# Patient Record
Sex: MALE | Race: WHITE | Employment: FULL TIME | URBAN - METROPOLITAN AREA
[De-identification: names, ages, dates, MRNs, and addresses within clinical notes are randomized per-mention and may not be internally consistent; named-entity substitution may affect disease eponyms.]

---

## 2021-05-20 ENCOUNTER — OFFICE VISIT (OUTPATIENT)
Dept: INTERNAL MEDICINE CLINIC | Age: 36
End: 2021-05-20
Payer: COMMERCIAL

## 2021-05-20 VITALS
OXYGEN SATURATION: 98 % | TEMPERATURE: 98.3 F | HEIGHT: 70 IN | HEART RATE: 99 BPM | RESPIRATION RATE: 18 BRPM | WEIGHT: 201 LBS | SYSTOLIC BLOOD PRESSURE: 132 MMHG | DIASTOLIC BLOOD PRESSURE: 90 MMHG | BODY MASS INDEX: 28.77 KG/M2

## 2021-05-20 DIAGNOSIS — H51.9 EYE MOVEMENT ABNORMALITY: ICD-10-CM

## 2021-05-20 DIAGNOSIS — Z00.00 ROUTINE GENERAL MEDICAL EXAMINATION AT A HEALTH CARE FACILITY: Primary | ICD-10-CM

## 2021-05-20 DIAGNOSIS — J30.89 ENVIRONMENTAL AND SEASONAL ALLERGIES: ICD-10-CM

## 2021-05-20 DIAGNOSIS — R41.3 MEMORY DIFFICULTIES: ICD-10-CM

## 2021-05-20 DIAGNOSIS — R42 DIZZINESS: ICD-10-CM

## 2021-05-20 DIAGNOSIS — R03.0 ELEVATED BLOOD PRESSURE READING WITHOUT DIAGNOSIS OF HYPERTENSION: ICD-10-CM

## 2021-05-20 DIAGNOSIS — Z76.89 ENCOUNTER TO ESTABLISH CARE: ICD-10-CM

## 2021-05-20 PROCEDURE — 99205 OFFICE O/P NEW HI 60 MIN: CPT | Performed by: NURSE PRACTITIONER

## 2021-05-20 RX ORDER — SODIUM CHLORIDE 0.65 %
1 AEROSOL, SPRAY (ML) NASAL AS NEEDED
Qty: 30 ML | Refills: 0
Start: 2021-05-20

## 2021-05-20 RX ORDER — FLUTICASONE PROPIONATE 50 MCG
2 SPRAY, SUSPENSION (ML) NASAL
Qty: 1 BOTTLE | Refills: 0
Start: 2021-05-20 | End: 2022-06-17

## 2021-05-20 NOTE — PROGRESS NOTES
[facility] [place] [address] [address] [facility], [address] [facility] [address] [place]  [phone] [id number]/[phone] fax    5/20/2021    HPI:   Bebo Muniz 1985 is a pleasant WHITE male who presents today to establish care and for routine physical exam. He is a  at [employer]. He has been experiencing dizziness and the feeling of tingling all over. He has also noticed some memory issues. His wife tells him his eyes will gaze off where 1 eye is looking straight ahead and the other eye may look off to the side. He does not know this is occuring until his wife mentions it. He wears glasses and his last eye appt was 1 yr ago. His mother has MS. He does suffer from seasonal allergies. No past medical history on file. No past surgical history on file. Current Outpatient Medications   Medication Sig    loratadine (CLARITIN PO) Take  by mouth.  fluticasone propionate (Flonase Allergy Relief) 50 mcg/actuation nasal spray 2 Sprays by Both Nostrils route daily as needed for Rhinitis or Allergies.  sodium chloride (Saline Nasal Mist) 0.65 % nasal squeeze bottle 0.05 mL by Both Nostrils route as needed for Congestion. No current facility-administered medications for this visit. Allergies and Intolerances:   Not on File  Family History:   No family history on file. Social History:   He  reports that he has never smoked. He has never used smokeless tobacco.   Social History     Substance and Sexual Activity   Alcohol Use None     Immunization History: There is no immunization history on file for this patient. Review of Systems:   As above included in HPI. Otherwise 11 point review of systems negative including constitutional, skin, HENT, eyes, respiratory, cardiovascular, gastrointestinal, genitourinary, musculoskeletal, endocrine, hematologic, allergy, and neurologic.       Physical:   Visit Vitals  BP (!) 132/90   Pulse 99   Temp 98.3 °F (36.8 °C) (Temporal)   Resp 18   Ht 5' 10\" (1.778 m)   Wt 201 lb (91.2 kg)   SpO2 98%   BMI 28.84 kg/m²      Wt Readings from Last 3 Encounters:   05/20/21 201 lb (91.2 kg)         Exam:   Physical Exam  Vitals and nursing note reviewed. Constitutional:       Appearance: Normal appearance. HENT:      Head: Normocephalic and atraumatic. Right Ear: Tympanic membrane, ear canal and external ear normal.      Left Ear: Tympanic membrane, ear canal and external ear normal.      Ears:      Comments: ventura ear canals irritated     Nose: Nose normal.      Mouth/Throat:      Mouth: Mucous membranes are moist.   Eyes:      General: Lids are normal. No visual field deficit. Extraocular Movements: Extraocular movements intact. Right eye: Normal extraocular motion and no nystagmus. Left eye: Normal extraocular motion and no nystagmus. Conjunctiva/sclera: Conjunctivae normal.      Pupils: Pupils are equal, round, and reactive to light. Comments: Prescription glasses    Neck:      Vascular: No carotid bruit. Cardiovascular:      Rate and Rhythm: Normal rate and regular rhythm. Pulses: Normal pulses. Heart sounds: Normal heart sounds. Comments: No edema noted  Pulmonary:      Effort: Pulmonary effort is normal. No respiratory distress. Breath sounds: Normal breath sounds. No wheezing. Abdominal:      General: Abdomen is flat. Bowel sounds are normal. There is no distension. Palpations: Abdomen is soft. Tenderness: There is no abdominal tenderness. Musculoskeletal:         General: Normal range of motion. Cervical back: Normal range of motion and neck supple. Comments: Gait stable   Skin:     General: Skin is warm and dry. Neurological:      General: No focal deficit present. Mental Status: He is alert and oriented to person, place, and time. Cranial Nerves: Cranial nerves are intact. No cranial nerve deficit. Sensory: Sensation is intact.  No sensory deficit. Motor: Motor function is intact. No weakness. Coordination: Coordination is intact. Coordination normal.      Gait: Gait is intact. Gait normal.      Comments: Neuro exam intact   Psychiatric:         Attention and Perception: Attention and perception normal.         Mood and Affect: Mood normal.         Speech: Speech normal.         Behavior: Behavior normal.         Thought Content: Thought content normal.         Cognition and Memory: Cognition and memory normal.         Judgment: Judgment normal.         Review of Data:  Labs reviewed: N/A  Unable to obtain labs at this time. Pt to call the office when he is ready to have labs drawn as his medical insurance is inactive. Plan:    ICD-10-CM ICD-9-CM    1. Routine general medical examination at a health care facility  Z00.00 V70.0    2. Encounter to establish care  Z76.89 V65.8    3. Environmental and seasonal allergies  J30.89 477.8 fluticasone propionate (Flonase Allergy Relief) 50 mcg/actuation nasal spray      sodium chloride (Saline Nasal Mist) 0.65 % nasal squeeze bottle   4. Dizziness  R42 780.4    5. Memory difficulties  R41.3 780.93 REFERRAL TO NEUROPSYCHOLOGY   6. Elevated blood pressure reading without diagnosis of hypertension  R03.0 796.2    7. Eye movement abnormality  H51.9 378.9      -Encounter to establish care and routine physical. There is no info in EMR. Completed CPE. Unable to obtain labs due to issues with patients medical insurance. He will call the office when he corrects his medical insurance and is ready to have labs drawn.    -Seasonal allergies  Take OTC allergy medication; avoid decongestant  Use saline spray before bed  Flonase in the am    -Memory difficulty  Unable to describe what he is experiencing. Not sure if this is memory or focus issues.    Will refer to neuropsych for consult.    -Elevated BP reading  No hx of HTN  Pt to check BP every day x2w and call results to office.     -Eye movement abnormality  This was not noted today.    Instructed pt to see optometrist for eye exam with dilatation      Follow up 1 year  Labs needed 1 week prior to appt: Yes    Dr. Kary Yusuf, AGNP-C, DNP  [facility] [address] [place] [address]

## 2021-05-20 NOTE — Clinical Note
5/20/2021 5:10 PM 
 
Mr. Tracey Perales 
[address] [address] Sincerely, Blayne Burks, DNP

## 2021-05-20 NOTE — Clinical Note
Beatrice Lopez call pt with the following:   Please call patient and remind him to get an eye exam with dilatation. I placed a referral for neuropsych for consult due to memory issues. Remind pt to check BP every day and call office in 2 weeks with results. Also, inquire if patient was able to correct his medical insurance so he can get labs completed.  Thank you

## 2021-05-24 PROBLEM — J30.89 ENVIRONMENTAL AND SEASONAL ALLERGIES: Status: ACTIVE | Noted: 2021-05-24

## 2021-05-24 PROBLEM — R03.0 ELEVATED BLOOD PRESSURE READING WITHOUT DIAGNOSIS OF HYPERTENSION: Status: ACTIVE | Noted: 2021-05-24

## 2021-05-24 PROBLEM — R41.3 MEMORY DIFFICULTIES: Status: ACTIVE | Noted: 2021-05-24

## 2021-05-24 PROBLEM — R42 DIZZINESS: Status: ACTIVE | Noted: 2021-05-24

## 2021-05-24 PROBLEM — H51.9 EYE MOVEMENT ABNORMALITY: Status: ACTIVE | Noted: 2021-05-24

## 2021-05-25 NOTE — PROGRESS NOTES
Patient called and reminded of the following per Dr. Reyes Mariee:    Please call patient and remind him to get an eye exam with dilatation. I placed a referral for neuropsych for consult due to memory issues. Remind pt to check BP every day and call office in 2 weeks with results. Also, inquire if patient was able to correct his medical insurance so he can get labs completed. -  Pt states he should have everything corrected with his insurance by the end of this week and he will call us to schedule lab appt hopefully on Tuesday.

## 2021-06-07 DIAGNOSIS — Z00.00 ROUTINE GENERAL MEDICAL EXAMINATION AT A HEALTH CARE FACILITY: Primary | ICD-10-CM

## 2021-06-07 NOTE — PROGRESS NOTES
Was unable to obtain labs at new patient appt/physical due to insurance issues. Pt is now coming in tomorrow to have these labs drawn since he has corrected his insurance. ICD-10-CM ICD-9-CM    1.  Routine general medical examination at a health care facility  F45.36 S80.0 METABOLIC PANEL, COMPREHENSIVE      LIPID PANEL

## 2021-06-08 ENCOUNTER — APPOINTMENT (OUTPATIENT)
Dept: INTERNAL MEDICINE CLINIC | Age: 36
End: 2021-06-08

## 2021-06-09 LAB
ALBUMIN SERPL-MCNC: 4.7 G/DL (ref 4–5)
ALBUMIN/GLOB SERPL: 1.9 {RATIO} (ref 1.2–2.2)
ALP SERPL-CCNC: 58 IU/L (ref 48–121)
ALT SERPL-CCNC: 26 IU/L (ref 0–44)
AST SERPL-CCNC: 20 IU/L (ref 0–40)
BILIRUB SERPL-MCNC: 0.5 MG/DL (ref 0–1.2)
BUN SERPL-MCNC: 16 MG/DL (ref 6–20)
BUN/CREAT SERPL: 16 (ref 9–20)
CALCIUM SERPL-MCNC: 9.3 MG/DL (ref 8.7–10.2)
CHLORIDE SERPL-SCNC: 104 MMOL/L (ref 96–106)
CHOLEST SERPL-MCNC: 241 MG/DL (ref 100–199)
CO2 SERPL-SCNC: 22 MMOL/L (ref 20–29)
CREAT SERPL-MCNC: 0.99 MG/DL (ref 0.76–1.27)
GLOBULIN SER CALC-MCNC: 2.5 G/DL (ref 1.5–4.5)
GLUCOSE SERPL-MCNC: 76 MG/DL (ref 65–99)
HDLC SERPL-MCNC: 41 MG/DL
IMP & REVIEW OF LAB RESULTS: NORMAL
LDLC SERPL CALC-MCNC: 161 MG/DL (ref 0–99)
POTASSIUM SERPL-SCNC: 4.3 MMOL/L (ref 3.5–5.2)
PROT SERPL-MCNC: 7.2 G/DL (ref 6–8.5)
SODIUM SERPL-SCNC: 141 MMOL/L (ref 134–144)
TRIGL SERPL-MCNC: 211 MG/DL (ref 0–149)
VLDLC SERPL CALC-MCNC: 39 MG/DL (ref 5–40)

## 2021-06-10 DIAGNOSIS — E78.2 MIXED HYPERLIPIDEMIA: Primary | ICD-10-CM

## 2021-06-10 DIAGNOSIS — I10 ESSENTIAL HYPERTENSION: ICD-10-CM

## 2021-06-10 RX ORDER — ATORVASTATIN CALCIUM 20 MG/1
20 TABLET, FILM COATED ORAL DAILY
Qty: 90 TABLET | Refills: 0 | Status: SHIPPED | OUTPATIENT
Start: 2021-06-10 | End: 2021-09-06

## 2021-06-10 RX ORDER — CHLORTHALIDONE 25 MG/1
25 TABLET ORAL DAILY
Qty: 90 TABLET | Refills: 0 | Status: SHIPPED | OUTPATIENT
Start: 2021-06-10 | End: 2021-09-06

## 2021-06-10 NOTE — PROGRESS NOTES
hao Akhtar contact pt with the followin) [facility] Diastolic readings remain elevated. Will initiate chlorthalidone 25mg every day. 2)  Goal <150;  Goal <100. Due to elevated levels and new dx of HTN, will initiate atorvastatin 20mg every day. 3) CMP good    Inquire if he can come in to see me on 21 at 11am. If so please schedule appt. We will discuss further follow up at this appt. No labs need prior to this appt.    Thank you

## 2021-06-10 NOTE — PROGRESS NOTES
ICD-10-CM ICD-9-CM    1. Mixed hyperlipidemia  E78.2 272.2 atorvastatin (LIPITOR) 20 mg tablet   2. Essential hypertension  I10 401.9 chlorthalidone (HYGROTON) 25 mg tablet     hao Akhtar contact pt with the followin) BP readings reviewed from MyChart from patient. Diastolic readings remain elevated. Will initiate chlorthalidone 25mg every day. 2)  Goal <150;  Goal <100. Due to elevated levels and new dx of HTN, will initiate atorvastatin 20mg every day. 3) CMP good    Inquire if he can come in to see me on 21 at 11am. If so please schedule appt. We will discuss further follow up at this appt. No labs need prior to this appt.    Thank you

## 2021-06-10 NOTE — PROGRESS NOTES
Patient reached and made aware of message per Dr. Trinity Wang:    1) BP readings reviewed from MyChart from patient. Diastolic readings remain elevated. Will initiate chlorthalidone 25mg every day. 2)  Goal <150;  Goal <100. Due to elevated levels and new dx of HTN, will initiate atorvastatin 20mg every day.    3) CMP good     Inquire if he can come in to see me on 21 at 11am. If so please schedule appt. We will discuss further follow up at this appt. No labs need prior to this appt.      -  Pt verbalized understanding and was scheduled for appointment on  at 11am.

## 2021-07-08 ENCOUNTER — OFFICE VISIT (OUTPATIENT)
Dept: INTERNAL MEDICINE CLINIC | Age: 36
End: 2021-07-08
Payer: COMMERCIAL

## 2021-07-08 VITALS
HEIGHT: 70 IN | HEART RATE: 105 BPM | DIASTOLIC BLOOD PRESSURE: 84 MMHG | TEMPERATURE: 98.2 F | BODY MASS INDEX: 28.23 KG/M2 | SYSTOLIC BLOOD PRESSURE: 133 MMHG | WEIGHT: 197.2 LBS | RESPIRATION RATE: 18 BRPM | OXYGEN SATURATION: 96 %

## 2021-07-08 DIAGNOSIS — H50.10 EXOTROPIA: ICD-10-CM

## 2021-07-08 DIAGNOSIS — E78.2 MIXED HYPERLIPIDEMIA: ICD-10-CM

## 2021-07-08 DIAGNOSIS — H40.003 GLAUCOMA SUSPECT OF BOTH EYES: ICD-10-CM

## 2021-07-08 DIAGNOSIS — H52.223 REGULAR ASTIGMATISM OF BOTH EYES: ICD-10-CM

## 2021-07-08 DIAGNOSIS — I10 ESSENTIAL HYPERTENSION: Primary | ICD-10-CM

## 2021-07-08 DIAGNOSIS — H52.13 MYOPIA OF BOTH EYES: ICD-10-CM

## 2021-07-08 PROBLEM — R03.0 ELEVATED BLOOD PRESSURE READING WITHOUT DIAGNOSIS OF HYPERTENSION: Status: RESOLVED | Noted: 2021-05-24 | Resolved: 2021-07-08

## 2021-07-08 PROCEDURE — 99214 OFFICE O/P EST MOD 30 MIN: CPT | Performed by: NURSE PRACTITIONER

## 2021-07-08 NOTE — PROGRESS NOTES
Chief Complaint   Patient presents with   [address]     completed 6/8/21       1. Have you been to the ER, urgent care clinic since your last visit? Hospitalized since your last visit? No    2. Have you seen or consulted any other health care providers outside of the [address] [place] [address] since your last visit? Include any pap smears or colon screening.  No.

## 2021-07-08 NOTE — PROGRESS NOTES
Subjective:     Jimi Monique is a 43 y.o. male who presents for follow up of hypertension and hyperlipidemia, eye concerns. Diet and Lifestyle: generally follows a low fat low cholesterol diet, exercises sporadically  Home BP Monitoring: Measures BP periodically. Cardiovascular ROS: taking medications as instructed, no medication side effects noted, no TIA's, no chest pain on exertion, no dyspnea on exertion, no swelling of ankles, no palpitations. New concerns: after starting hygroton, noted BM freq decline to every 3-4 days instead of daily. Have not been consuming much water but have been outside in the heat. He saw optometrist for eye exam and dilatation. She mentioned \"increased pressure in his eyes. \"  She referred him to an ophthalmologist at [facility] who performed an OCT test.  Patient is waiting for these results. He is unsure of what they are looking for although he does have glaucoma in his family. Patient Active Problem List   Diagnosis Code    Eye movement abnormality H51.9    Memory difficulties R41.3    Dizziness R42    Environmental and seasonal allergies J30.89    Essential hypertension I10    Mixed hyperlipidemia E78.2    Glaucoma suspect of both eyes H40.003    Regular astigmatism of both eyes H52.223    Myopia of both eyes H52.13     Current Outpatient Medications   Medication Sig Dispense Refill    chlorthalidone (HYGROTON) 25 mg tablet Take 1 Tablet by mouth daily. Indications: high blood pressure 90 Tablet 0    atorvastatin (LIPITOR) 20 mg tablet Take 1 Tablet by mouth daily. 90 Tablet 0    loratadine (CLARITIN PO) Take  by mouth.  fluticasone propionate (Flonase Allergy Relief) 50 mcg/actuation nasal spray 2 Sprays by Both Nostrils route daily as needed for Rhinitis or Allergies. 1 Bottle 0    sodium chloride (Saline Nasal Mist) 0.65 % nasal squeeze bottle 0.05 mL by Both Nostrils route as needed for Congestion.  30 mL 0     Not on File Review of Systems, additional:  Pertinent items are noted in HPI. Objective:     Visit Vitals  /84   Pulse (!) 105   Temp 98.2 °F (36.8 °C) (Temporal)   Resp 18   Ht 5' 10\" (1.778 m)   Wt 197 lb 3.2 oz (89.4 kg)   SpO2 96%   BMI 28.30 kg/m²     Appearance: alert, well appearing, and in no distress, oriented to person, place, and time, overweight and improved. General exam: CVS exam BP noted to be well controlled today in office, S1, S2 normal, no gallop, no murmur, chest clear, no JVD, no HSM, no edema. Lab review: no lab studies available for review at time of visit. Assessment/Plan:       ICD-10-CM ICD-9-CM    1. Essential hypertension  R05 658.2 METABOLIC PANEL, COMPREHENSIVE   2. Mixed hyperlipidemia  E78.2 272.2 LIPID PANEL   3. Exotropia  H50.10 378.10    4. Myopia of both eyes  H52.13 367.1    5. Regular astigmatism of both eyes  H52.223 367.21    6. Glaucoma suspect of both eyes  H40.003 365.00      hypertension well controlled, stable, asymptomatic, hyperlipidemia asymptomatic, no significant medication side effects noted, will assess CMP and Lipid 3 months. current treatment plan is effective, no change in therapy  repeat labs ordered prior to next appointment  orders and follow up as documented in patient record  reviewed diet, exercise and weight control  recommended sodium restriction  cardiovascular risk and specific lipid/LDL goals reviewed  reviewed medications and side effects in detail. Reviewed eye exam from Dr. Lisy Hinton. Diagnosis of exotropia. Referred patient to [facility] for an OCT to rule out MS and glaucomatous changes versus a decompensating phoria.     Dr Verner Hoh, JASKARANP-C, DNP  [facility]

## 2021-09-05 DIAGNOSIS — I10 ESSENTIAL HYPERTENSION: ICD-10-CM

## 2021-09-05 DIAGNOSIS — E78.2 MIXED HYPERLIPIDEMIA: ICD-10-CM

## 2021-09-06 RX ORDER — CHLORTHALIDONE 25 MG/1
TABLET ORAL
Qty: 30 TABLET | Refills: 0 | Status: SHIPPED | OUTPATIENT
Start: 2021-09-06 | End: 2021-09-28 | Stop reason: SDUPTHER

## 2021-09-06 RX ORDER — ATORVASTATIN CALCIUM 20 MG/1
TABLET, FILM COATED ORAL
Qty: 30 TABLET | Refills: 0 | Status: SHIPPED | OUTPATIENT
Start: 2021-09-06 | End: 2021-09-28 | Stop reason: SDUPTHER

## 2021-09-07 NOTE — TELEPHONE ENCOUNTER
Refilled x1 month.  F/U 8/28/21    Requested Prescriptions     Signed Prescriptions Disp Refills    chlorthalidone (HYGROTON) 25 mg tablet 30 Tablet 0     Sig: TAKE 1 TABLET BY MOUTH EVERY DAY FOR HIGH BLOOD PRESSURE     Authorizing Provider: SHAMEKA VILLAGRAN    atorvastatin (LIPITOR) 20 mg tablet 30 Tablet 0     Sig: TAKE 1 TABLET BY MOUTH EVERY DAY     Authorizing Provider: Manas Horvath

## 2021-09-21 ENCOUNTER — APPOINTMENT (OUTPATIENT)
Dept: INTERNAL MEDICINE CLINIC | Age: 36
End: 2021-09-21

## 2021-09-21 DIAGNOSIS — E78.2 MIXED HYPERLIPIDEMIA: ICD-10-CM

## 2021-09-21 DIAGNOSIS — I10 ESSENTIAL HYPERTENSION: ICD-10-CM

## 2021-09-22 LAB
ALBUMIN SERPL-MCNC: 4.8 G/DL (ref 4–5)
ALBUMIN/GLOB SERPL: 1.9 {RATIO} (ref 1.2–2.2)
ALP SERPL-CCNC: 63 IU/L (ref 44–121)
ALT SERPL-CCNC: 35 IU/L (ref 0–44)
AST SERPL-CCNC: 24 IU/L (ref 0–40)
BILIRUB SERPL-MCNC: 0.6 MG/DL (ref 0–1.2)
BUN SERPL-MCNC: 17 MG/DL (ref 6–20)
BUN/CREAT SERPL: 16 (ref 9–20)
CALCIUM SERPL-MCNC: 9.6 MG/DL (ref 8.7–10.2)
CHLORIDE SERPL-SCNC: 99 MMOL/L (ref 96–106)
CHOLEST SERPL-MCNC: 175 MG/DL (ref 100–199)
CO2 SERPL-SCNC: 31 MMOL/L (ref 20–29)
CREAT SERPL-MCNC: 1.06 MG/DL (ref 0.76–1.27)
GLOBULIN SER CALC-MCNC: 2.5 G/DL (ref 1.5–4.5)
GLUCOSE SERPL-MCNC: 89 MG/DL (ref 65–99)
HDLC SERPL-MCNC: 46 MG/DL
IMP & REVIEW OF LAB RESULTS: NORMAL
LDLC SERPL CALC-MCNC: 99 MG/DL (ref 0–99)
POTASSIUM SERPL-SCNC: 4.1 MMOL/L (ref 3.5–5.2)
PROT SERPL-MCNC: 7.3 G/DL (ref 6–8.5)
SODIUM SERPL-SCNC: 142 MMOL/L (ref 134–144)
TRIGL SERPL-MCNC: 171 MG/DL (ref 0–149)
VLDLC SERPL CALC-MCNC: 30 MG/DL (ref 5–40)

## 2021-09-28 ENCOUNTER — OFFICE VISIT (OUTPATIENT)
Dept: INTERNAL MEDICINE CLINIC | Age: 36
End: 2021-09-28
Payer: COMMERCIAL

## 2021-09-28 VITALS
BODY MASS INDEX: 28.4 KG/M2 | OXYGEN SATURATION: 99 % | TEMPERATURE: 96.6 F | RESPIRATION RATE: 18 BRPM | SYSTOLIC BLOOD PRESSURE: 126 MMHG | DIASTOLIC BLOOD PRESSURE: 87 MMHG | HEIGHT: 70 IN | WEIGHT: 198.4 LBS | HEART RATE: 97 BPM

## 2021-09-28 DIAGNOSIS — I10 ESSENTIAL HYPERTENSION: Primary | ICD-10-CM

## 2021-09-28 DIAGNOSIS — H52.13 MYOPIA OF BOTH EYES: ICD-10-CM

## 2021-09-28 DIAGNOSIS — Z23 NEEDS FLU SHOT: ICD-10-CM

## 2021-09-28 DIAGNOSIS — E78.2 MIXED HYPERLIPIDEMIA: ICD-10-CM

## 2021-09-28 DIAGNOSIS — J30.89 ENVIRONMENTAL AND SEASONAL ALLERGIES: ICD-10-CM

## 2021-09-28 PROCEDURE — 99214 OFFICE O/P EST MOD 30 MIN: CPT | Performed by: NURSE PRACTITIONER

## 2021-09-28 PROCEDURE — 90471 IMMUNIZATION ADMIN: CPT | Performed by: NURSE PRACTITIONER

## 2021-09-28 PROCEDURE — 90686 IIV4 VACC NO PRSV 0.5 ML IM: CPT | Performed by: NURSE PRACTITIONER

## 2021-09-28 RX ORDER — CHLORTHALIDONE 25 MG/1
25 TABLET ORAL DAILY
Qty: 90 TABLET | Refills: 1 | Status: SHIPPED | OUTPATIENT
Start: 2021-09-28 | End: 2022-06-17 | Stop reason: SDUPTHER

## 2021-09-28 RX ORDER — ATORVASTATIN CALCIUM 20 MG/1
20 TABLET, FILM COATED ORAL DAILY
Qty: 90 TABLET | Refills: 1 | Status: SHIPPED | OUTPATIENT
Start: 2021-09-28 | End: 2022-06-17 | Stop reason: SDUPTHER

## 2021-09-28 NOTE — PROGRESS NOTES
[facility] [place] [address] [address] [facility], [address]   [phone] AGUS/[phone] fax    9/28/2021    HPI:   Sharon Vo 1985 is a pleasant WHITE/NON- male who presents today to for routine follow up. He is a  at [employer]. Hypertension: Taking chlorthalidone without side effects or complications. Not checking blood pressures at home. Trying to avoid excessive amounts of sodium but does consume sodium within his diet. He has not been concentrating on hydration as he is not consuming enough water throughout the day. Cholesterol: Continues atorvastatin without side effects or complications. Seasonal allergies: Taking Claritin and using Flonase as needed. He saw his optometrist for a dilated eye exam who then referred him to [facility] for a special scan test.  He never heard from the ophthalmologist with results of the scan. No past medical history on file. Past Surgical History:   Procedure Laterality Date    HX APPENDECTOMY  0    HX BACK SURGERY  2018     Current Outpatient Medications   Medication Sig    chlorthalidone (HYGROTON) 25 mg tablet Take 1 Tablet by mouth daily.  atorvastatin (LIPITOR) 20 mg tablet Take 1 Tablet by mouth daily.  loratadine (CLARITIN PO) Take  by mouth.  fluticasone propionate (Flonase Allergy Relief) 50 mcg/actuation nasal spray 2 Sprays by Both Nostrils route daily as needed for Rhinitis or Allergies.  sodium chloride (Saline Nasal Mist) 0.65 % nasal squeeze bottle 0.05 mL by Both Nostrils route as needed for Congestion. No current facility-administered medications for this visit. Allergies and Intolerances:   Not on File  Family History:   Family History   Problem Relation Age of Onset   Christine Edson MS Mother     Glaucoma Mother     Hypertension Father     No Known Problems Brother      Social History:   He  reports that he has never smoked.  He has never used smokeless tobacco.   Social History     Substance and Sexual Activity   Alcohol Use Yes    Alcohol/week: 3.0 standard drinks    Types: 3 Cans of beer per week     Immunization History:  Immunization History   Administered Date(s) Administered    Covid-19, MODERNA, Mrna, Lnp-s, Pf, 100mcg/0.5mL 02/25/2021, 03/26/2021       Review of Systems:   As above included in HPI. Otherwise 11 point review of systems negative including constitutional, skin, HENT, eyes, respiratory, cardiovascular, gastrointestinal, genitourinary, musculoskeletal, endocrine, hematologic, allergy, and neurologic. Physical:   Visit Vitals  /87   Pulse 97   Temp (!) 96.6 °F (35.9 °C) (Temporal)   Resp 18   Ht 5' 10\" (1.778 m)   Wt 198 lb 6.4 oz (90 kg)   SpO2 99%   BMI 28.47 kg/m²      Wt Readings from Last 3 Encounters:   09/28/21 198 lb 6.4 oz (90 kg)   07/08/21 197 lb 3.2 oz (89.4 kg)   05/20/21 201 lb (91.2 kg)         Exam:   Physical Exam  Vitals and nursing note reviewed. Constitutional:       Appearance: Normal appearance. HENT:      Head: Normocephalic and atraumatic. Right Ear: External ear normal.      Left Ear: External ear normal.      Ears:      Comments: ventura ear canals irritated  Eyes:      General: Lids are normal. No visual field deficit. Extraocular Movements: Extraocular movements intact. Right eye: Normal extraocular motion and no nystagmus. Left eye: Normal extraocular motion and no nystagmus. Conjunctiva/sclera: Conjunctivae normal.      Comments: Prescription glasses    Cardiovascular:      Rate and Rhythm: Normal rate and regular rhythm. Pulses: Normal pulses. Heart sounds: Normal heart sounds. Comments: No edema noted  Pulmonary:      Effort: Pulmonary effort is normal. No respiratory distress. Breath sounds: Normal breath sounds. No wheezing. Musculoskeletal:         General: Normal range of motion.       Cervical back: Normal range of motion and neck supple. Comments: Gait stable   Skin:     General: Skin is warm and dry. Neurological:      General: No focal deficit present. Mental Status: He is alert and oriented to person, place, and time. Cranial Nerves: Cranial nerves are intact. Sensory: Sensation is intact. Motor: Motor function is intact. Coordination: Coordination is intact. Gait: Gait is intact. Psychiatric:         Attention and Perception: Attention and perception normal.         Mood and Affect: Mood normal.         Speech: Speech normal.         Behavior: Behavior normal.         Thought Content: Thought content normal.         Cognition and Memory: Cognition and memory normal.         Judgment: Judgment normal.         Review of Data:  Labs reviewed: N/A  Unable to obtain labs at this time. Pt to call the office when he is ready to have labs drawn as his medical insurance is inactive. Plan:    ICD-10-CM ICD-9-CM    1. Essential hypertension  I10 401.9 chlorthalidone (HYGROTON) 25 mg tablet   2. Mixed hyperlipidemia  E78.2 272.2 atorvastatin (LIPITOR) 20 mg tablet   3. Environmental and seasonal allergies  J30.89 477.8    4. Myopia of both eyes  H52.13 367.1    5. Needs flu shot  Z23 V04.81 INFLUENZA VIRUS VAC QUAD,SPLIT,PRESV FREE SYRINGE IM     Hypertension  continue chlorthalidone 25 mg daily  Limit sodium in diet to 2g/d  Avoid pork  Initiate cardio exercise  Weight reduction  Increase water intake  Report BP readings greater than 139/89 to office    Mixed hyperlipidemia  -99; -171  continue simvastatin 20 mg daily  Reduce fried fatty or oily foods in diet  Limit red meats and alcohol.     Limit fast food  Work on weight reduction  Increase water intake    -Seasonal allergies  continue Claritin and Flonase as needed  Avoid allergens (strong smells, animals, cigarettes or carpeted areas)    Myopia bilateral eyes  Requested to obtain scan results from [facility] eye    Vaccination  Obtain flu vaccine today      Follow up 6 months   Labs needed 1 week prior to appt: no      Dr. Jennette Nageotte, JASKARANP-C, DNP  [facility]     Total time: 30 minutes spent with the patient on counseling, answering questions, and/or coordination of care.

## 2021-09-28 NOTE — PATIENT INSTRUCTIONS
Vaccine Information Statement    Influenza (Flu) Vaccine (Inactivated or Recombinant): What You Need to Know    Many vaccine information statements are available in Occitan and other languages. See www.immunize.org/vis. Hojas de información sobre vacunas están disponibles en español y en muchos otros idiomas. Visite www.immunize.org/vis. 1. Why get vaccinated? Influenza vaccine can prevent influenza (flu). Flu is a contagious disease that spreads around the [facility] every year, usually between October and May. Anyone can get the flu, but it is more dangerous for some people. Infants and young children, people 72 years and older, pregnant people, and people with certain health conditions or a weakened immune system are at greatest risk of flu complications. Pneumonia, bronchitis, sinus infections, and ear infections are examples of flu-related complications. If you have a medical condition, such as heart disease, cancer, or diabetes, flu can make it worse. Flu can cause fever and chills, sore throat, muscle aches, fatigue, cough, headache, and runny or stuffy nose. Some people may have vomiting and diarrhea, though this is more common in children than adults. In an average year, thousands of people in the [facility] die from flu, and many more are hospitalized. Flu vaccine prevents millions of illnesses and flu-related visits to the doctor each year. 2. Influenza vaccines     CDC recommends everyone 6 months and older get vaccinated every flu season. Children 6 months through 6years of age may need 2 doses during a single flu season. Everyone else needs only 1 dose each flu season. It takes about 2 weeks for protection to develop after vaccination. There are many flu viruses, and they are always changing. Each year a new flu vaccine is made to protect against the influenza viruses believed to be likely to cause disease in the upcoming flu season.  Even when the vaccine doesnt exactly match these viruses, it may still provide some protection. Influenza vaccine does not cause flu. Influenza vaccine may be given at the same time as other vaccines. 3. Talk with your health care provider    Tell your vaccination provider if the person getting the vaccine:   Has had an allergic reaction after a previous dose of influenza vaccine, or has any severe, life-threatening allergies    Has ever had Guillain-Barré Syndrome (also called GBS)    In some cases, your health care provider may decide to postpone influenza vaccination until a future visit. Influenza vaccine can be administered at any time during pregnancy. People who are or will be pregnant during influenza season should receive inactivated influenza vaccine. People with minor illnesses, such as a cold, may be vaccinated. People who are moderately or severely ill should usually wait until they recover before getting influenza vaccine. Your health care provider can give you more information. 4. Risks of a vaccine reaction     Soreness, redness, and swelling where the shot is given, fever, muscle aches, and headache can happen after influenza vaccination.  There may be a very small increased risk of Guillain-Barré Syndrome (GBS) after inactivated influenza vaccine (the flu shot). Aylin Bird children who get the flu shot along with pneumococcal vaccine (PCV13) and/or DTaP vaccine at the same time might be slightly more likely to have a seizure caused by fever. Tell your health care provider if a child who is getting flu vaccine has ever had a seizure. People sometimes faint after medical procedures, including vaccination. Tell your provider if you feel dizzy or have vision changes or ringing in the ears. As with any medicine, there is a very remote chance of a vaccine causing a severe allergic reaction, other serious injury, or death. 5. What if there is a serious problem?     An allergic reaction could occur after the vaccinated person leaves the clinic. If you see signs of a severe allergic reaction (hives, swelling of the face and throat, difficulty breathing, a fast heartbeat, dizziness, or weakness), call 9-1-1 and get the person to the nearest hospital.    For other signs that concern you, call your health care provider. Adverse reactions should be reported to the Vaccine Adverse Event Reporting System (VAERS). Your health care provider will usually file this report, or you can do it yourself. Visit the VAERS website at www.vaers. [facility].gov or call 7-451.157.7306. VAERS is only for reporting reactions, and VAERS staff members do not give medical advice. 6. The National Vaccine Injury Compensation Program    The [facility] Vaccine Injury Compensation Program (VICP) is a federal program that was created to compensate people who may have been injured by certain vaccines. Claims regarding alleged injury or death due to vaccination have a time limit for filing, which may be as short as two years. Visit the VICP website at www.[facility].gov/vaccinecompensation or call 5-966.632.1055 to learn about the program and about filing a claim. 7. How can I learn more?  Ask your health care provider.  Call your local or state health department.  Visit the website of the Food and Drug Administration (FDA) for vaccine package inserts and additional information at www.fda.gov/vaccines-blood-biologics/vaccines.  Contact the Centers for Disease Control and Prevention (CDC):  - Call 7-612.745.1876 (1-800-CDC-INFO) or  - Visit CDCs influenza website at www.cdc.gov/flu. Vaccine Information Statement   Inactivated Influenza Vaccine   8/6/2021  42 FRANKIE Layton [id number]   Department of Health and Human Services  Centers for Disease Control and Prevention    Office Use Only

## 2021-09-28 NOTE — PROGRESS NOTES
Chief Complaint   Patient presents with    Hypertension     3 mo f/u    Cholesterol Problem    Labs     completed 9/21/2021       1. Have you been to the ER, urgent care clinic since your last visit? Hospitalized since your last visit? No    2. Have you seen or consulted any other health care providers outside of the [address] [place] [address] since your last visit? Include any pap smears or colon screening.  No

## 2021-09-28 NOTE — PROGRESS NOTES
Emely Conchita 1985 male who presents for routine immunizations. Patient denies any symptoms , reactions or allergies that would exclude them from being immunized today. Risks and adverse reactions were discussed and the VIS was given to them. All questions were addressed. Order placed for Influenza, per Verbal Order from Leonarda Matta, [address], with read back. Patient declined observation.     Davin Turcios CMA

## 2022-03-18 PROBLEM — J30.89 ENVIRONMENTAL AND SEASONAL ALLERGIES: Status: ACTIVE | Noted: 2021-05-24

## 2022-03-19 PROBLEM — R41.3 MEMORY DIFFICULTIES: Status: ACTIVE | Noted: 2021-05-24

## 2022-03-19 PROBLEM — H51.9 EYE MOVEMENT ABNORMALITY: Status: ACTIVE | Noted: 2021-05-24

## 2022-03-19 PROBLEM — E78.2 MIXED HYPERLIPIDEMIA: Status: ACTIVE | Noted: 2021-07-08

## 2022-03-19 PROBLEM — H52.13 MYOPIA OF BOTH EYES: Status: ACTIVE | Noted: 2021-07-08

## 2022-03-19 PROBLEM — H52.223 REGULAR ASTIGMATISM OF BOTH EYES: Status: ACTIVE | Noted: 2021-07-08

## 2022-03-19 PROBLEM — I10 ESSENTIAL HYPERTENSION: Status: ACTIVE | Noted: 2021-07-08

## 2022-03-19 PROBLEM — R42 DIZZINESS: Status: ACTIVE | Noted: 2021-05-24

## 2022-03-20 PROBLEM — H40.003 GLAUCOMA SUSPECT OF BOTH EYES: Status: ACTIVE | Noted: 2021-07-08

## 2022-05-09 DIAGNOSIS — E78.2 MIXED HYPERLIPIDEMIA: ICD-10-CM

## 2022-05-10 RX ORDER — ATORVASTATIN CALCIUM 20 MG/1
TABLET, FILM COATED ORAL
Qty: 90 TABLET | Refills: 1 | OUTPATIENT
Start: 2022-05-10

## 2022-05-11 NOTE — TELEPHONE ENCOUNTER
Requested Prescriptions     Refused Prescriptions Disp Refills    atorvastatin (LIPITOR) 20 mg tablet [Pharmacy Med Name: ATORVASTATIN 20 MG TABLET] 90 Tablet 1     Sig: TAKE 1 TABLET BY MOUTH EVERY DAY     Refused By: Megan Whiteside     Reason for Refusal: Appt required, please call patient

## 2022-06-17 ENCOUNTER — OFFICE VISIT (OUTPATIENT)
Dept: INTERNAL MEDICINE CLINIC | Age: 37
End: 2022-06-17
Payer: COMMERCIAL

## 2022-06-17 VITALS
OXYGEN SATURATION: 96 % | HEIGHT: 70 IN | BODY MASS INDEX: 28.35 KG/M2 | TEMPERATURE: 98.5 F | WEIGHT: 198 LBS | HEART RATE: 108 BPM | DIASTOLIC BLOOD PRESSURE: 85 MMHG | SYSTOLIC BLOOD PRESSURE: 122 MMHG | RESPIRATION RATE: 18 BRPM

## 2022-06-17 DIAGNOSIS — I10 ESSENTIAL HYPERTENSION: Primary | ICD-10-CM

## 2022-06-17 DIAGNOSIS — Z98.890 PREVIOUS BACK SURGERY: ICD-10-CM

## 2022-06-17 DIAGNOSIS — M62.830 BACK MUSCLE SPASM: ICD-10-CM

## 2022-06-17 DIAGNOSIS — G89.29 CHRONIC RIGHT-SIDED LOW BACK PAIN WITHOUT SCIATICA: ICD-10-CM

## 2022-06-17 DIAGNOSIS — M54.50 CHRONIC RIGHT-SIDED LOW BACK PAIN WITHOUT SCIATICA: ICD-10-CM

## 2022-06-17 DIAGNOSIS — E78.2 MIXED HYPERLIPIDEMIA: ICD-10-CM

## 2022-06-17 PROCEDURE — 99214 OFFICE O/P EST MOD 30 MIN: CPT | Performed by: NURSE PRACTITIONER

## 2022-06-17 RX ORDER — CYCLOBENZAPRINE HCL 10 MG
10 TABLET ORAL
Qty: 30 TABLET | Refills: 1 | Status: SHIPPED | OUTPATIENT
Start: 2022-06-17

## 2022-06-17 RX ORDER — ATORVASTATIN CALCIUM 20 MG/1
20 TABLET, FILM COATED ORAL DAILY
Qty: 90 TABLET | Refills: 1 | Status: SHIPPED | OUTPATIENT
Start: 2022-06-17

## 2022-06-17 RX ORDER — CHLORTHALIDONE 25 MG/1
25 TABLET ORAL DAILY
Qty: 90 TABLET | Refills: 1 | Status: SHIPPED | OUTPATIENT
Start: 2022-06-17

## 2022-06-17 RX ORDER — MELOXICAM 7.5 MG/1
7.5 TABLET ORAL
Qty: 30 TABLET | Refills: 1 | Status: SHIPPED | OUTPATIENT
Start: 2022-06-17 | End: 2022-08-22

## 2022-06-17 NOTE — PROGRESS NOTES
[facility] [id number] [place], [place] [address]s, [address] [facility] Erika  [phone] [facility]/[phone] fax    6/17/2022    HPI:   Shannan White 1985 is a pleasant WHITE/NON- male. He is  and has 5 children. He is a  at [employer]. No past medical history on file. Past Surgical History:   Procedure Laterality Date    HX APPENDECTOMY  0    HX BACK SURGERY  2018     Current Outpatient Medications   Medication Sig    atorvastatin (LIPITOR) 20 mg tablet Take 1 Tablet by mouth daily.  chlorthalidone (HYGROTON) 25 mg tablet Take 1 Tablet by mouth daily.  cyclobenzaprine (FLEXERIL) 10 mg tablet Take 1 Tablet by mouth three (3) times daily as needed for Muscle Spasm(s) (Muscle spasms).  meloxicam (MOBIC) 7.5 mg tablet Take 1 Tablet by mouth daily as needed for Pain.  loratadine (CLARITIN PO) Take  by mouth.  sodium chloride (Saline Nasal Mist) 0.65 % nasal squeeze bottle 0.05 mL by Both Nostrils route as needed for Congestion. No current facility-administered medications for this visit. Allergies and Intolerances:   Not on File  Family History:   Family History   Problem Relation Age of Onset    Mult Sclerosis Mother     Glaucoma Mother     Hypertension Father     No Known Problems Brother      Social History:   He  reports that he has never smoked. He has never used smokeless tobacco.   Social History     Substance and Sexual Activity   Alcohol Use Yes    Alcohol/week: 3.0 standard drinks    Types: 3 Cans of beer per week     Immunization History:  Immunization History   Administered Date(s) Administered    COVID-19, Sj Bryan, Primary or Immunocompromised Series, MRNA, PF, 100mcg/0.5mL 02/25/2021, 03/26/2021    Influenza Vaccine myinfoQ) PF (>6 Mo Flulaval, Fluarix, and >3 Yrs [address] [place] [address], [facility] [id number]) 09/28/2021     Todays concerns/HPI:  1. Hypertension: Taking chlorthalidone without side effects or complications. 2.  Cholesterol: Continues atorvastatin therapy without side effects. 3. Seasonal allergies: Controlled when taking Claritin. 4.  Myopia. 2021, he saw optometrist for eye exam and dilatation. She mentioned \"increased pressure in his eyes. \"  She referred him to an ophthalmologist at [place] [address] [facility] who performed an OCT test.  No definitive answer. 5.  Chronic back pain/muscle spasms. In 2018 he underwent back surgery and on occasion he will have pain and muscle spasms. At times the pain can be excruciating and debilitating. He uses Motrin as needed. He was taking Flexeril as needed but believes these are . He does not take Flexeril during the day due to causes drowsiness. Approximately 8 months ago his pain put him out of work for a week and a half. He was informed by his previous spine specialist that he may need further therapy i.e. surgery in the next 5 to 10 years. Patient is seeking a referral to spine specialist in order to establish care for possible future needs. Review of Systems:   As above included in HPI. Otherwise 11 point review of systems negative including constitutional, skin, HENT, eyes, respiratory, cardiovascular, gastrointestinal, genitourinary, musculoskeletal, endocrine, hematologic, allergy, and neurologic. Physical:   Visit Vitals  /85   Pulse (!) 108   Temp 98.5 °F (36.9 °C) (Temporal)   Resp 18   Ht 5' 10\" (1.778 m)   Wt 198 lb (89.8 kg)   SpO2 96%   BMI 28.41 kg/m²      Wt Readings from Last 3 Encounters:   22 198 lb (89.8 kg)   21 198 lb 6.4 oz (90 kg)   21 197 lb 3.2 oz (89.4 kg)     Exam:   Physical Exam  Vitals and nursing note reviewed. Constitutional:       Appearance: Normal appearance. He is obese. HENT:      Head: Normocephalic and atraumatic. Right Ear: External ear normal.      Left Ear: External ear normal.   Eyes:      General: Lids are normal. No visual field deficit.      Extraocular Movements: Extraocular movements intact. Right eye: Normal extraocular motion and no nystagmus. Left eye: Normal extraocular motion and no nystagmus. Conjunctiva/sclera: Conjunctivae normal.      Comments: Prescription glasses    Cardiovascular:      Rate and Rhythm: Normal rate and regular rhythm. Heart sounds: Normal heart sounds. Pulmonary:      Effort: Pulmonary effort is normal. No respiratory distress. Breath sounds: Normal breath sounds. No wheezing. Musculoskeletal:         General: Normal range of motion. Cervical back: Normal range of motion and neck supple. Right lower leg: No edema. Left lower leg: No edema. Comments: Gait stable. No limitations noted. Skin:     General: Skin is warm and dry. Neurological:      General: No focal deficit present. Mental Status: He is alert and oriented to person, place, and time. Cranial Nerves: Cranial nerves are intact. Sensory: Sensation is intact. Motor: Motor function is intact. Coordination: Coordination is intact. Gait: Gait is intact. Psychiatric:         Attention and Perception: Attention and perception normal.         Mood and Affect: Mood normal.         Speech: Speech normal.         Behavior: Behavior normal.         Cognition and Memory: Cognition and memory normal.       Review of Data:  Patient to complete labs on Monday (earliest day pt could complete labs)    Plan:    ICD-10-CM ICD-9-CM    1. Essential hypertension  I10 401.9 chlorthalidone (HYGROTON) 25 mg tablet      METABOLIC PANEL, COMPREHENSIVE      CANCELED: METABOLIC PANEL, COMPREHENSIVE   2. Mixed hyperlipidemia  E78.2 272.2 atorvastatin (LIPITOR) 20 mg tablet      LIPID PANEL      CANCELED: LIPID PANEL   3. Back muscle spasm  M62.830 724.8 cyclobenzaprine (FLEXERIL) 10 mg tablet      meloxicam (MOBIC) 7.5 mg tablet   4. Previous back surgery  Z98.890 V45.89 REFERRAL TO PHYSICIAL MEDICINE REHAB   5.  Chronic right-sided low back pain without sciatica  M54.50 724.2 REFERRAL TO PHYSICIAL MEDICINE REHAB    G89.29 338.29        1. Essential hypertension  BP well controlled  Continue chlorthalidone  Obtain CMP on Monday    - chlorthalidone (HYGROTON) 25 mg tablet; Take 1 Tablet by mouth daily. Dispense: 90 Tablet; Refill: 1  - METABOLIC PANEL, COMPREHENSIVE; Future    2. Mixed hyperlipidemia  9/2021 -99; -171  Continue atorvastatin  Obtain lipid level Monday    - atorvastatin (LIPITOR) 20 mg tablet; Take 1 Tablet by mouth daily. Dispense: 90 Tablet; Refill: 1  - LIPID PANEL; Future    3. Back muscle spasm  Refilled Flexeril  Instructed patient not to drive or operate heavy machinery while taking medication due to may cause drowsiness  Initiate meloxicam  Do not take any NSAIDs such as Motrin, Ibuprofen, Aleve or BC powder while taking this NSAID. Encourage patient to eat prior to taking medication to help prevent GI upset. Placed referral to spine specialist per patient request in order to establish care in case of future needs. - cyclobenzaprine (FLEXERIL) 10 mg tablet; Take 1 Tablet by mouth three (3) times daily as needed for Muscle Spasm(s) (Muscle spasms). Dispense: 30 Tablet; Refill: 1  - meloxicam (MOBIC) 7.5 mg tablet; Take 1 Tablet by mouth daily as needed for Pain. Dispense: 30 Tablet; Refill: 1    4. Previous back surgery    - REFERRAL TO PHYSICIAL MEDICINE REHAB    5. Chronic right-sided low back pain without sciatica    - REFERRAL TO PHYSICIAL MEDICINE REHAB    -Seasonal allergies  continue Claritin as needed  Avoid allergens (strong smells, animals, cigarettes or carpeted areas)    -Myopia bilateral eyes  Followed up with [facility]. Unfortunately no diagnosis as of yet. Follow up 6 months CPE with labs (lipid, CMP)  We will place lab orders the day of his lab visit to prevent these labs being drawn prematurely. Diagnosis code will be lab for CPE.       Dr. Fortino Valentin, JASKARANP-C, DNP  [facility]

## 2022-06-17 NOTE — PROGRESS NOTES
Viktor Prince presents today for   Chief Complaint   Patient presents with    Follow-up    Hypertension       1. \"Have you been to the ER, urgent care clinic since your last visit? Hospitalized since your last visit? \" no    2. \"Have you seen or consulted any other health care providers outside of the [address] [place] [address] since your last visit? \" no     3. For patients aged 39-70: Has the patient had a colonoscopy / FIT/ Cologuard? NA - based on age      If the patient is female:    4. For patients aged 41-77: Has the patient had a mammogram within the past 2 years? NA - based on age or sex  See top three    5. For patients aged 21-65: Has the patient had a pap smear?  NA - based on age or sex

## 2022-06-20 ENCOUNTER — HOSPITAL ENCOUNTER (OUTPATIENT)
Dept: LAB | Age: 37
Discharge: HOME | End: 2022-06-20

## 2022-06-20 LAB — XX-LABCORP SPECIMEN COL,LCBCF: NORMAL

## 2022-06-20 PROCEDURE — 99001 SPECIMEN HANDLING PT-LAB: CPT

## 2022-06-21 LAB
ALBUMIN SERPL-MCNC: 4.6 G/DL (ref 4–5)
ALBUMIN/GLOB SERPL: 2.1 {RATIO} (ref 1.2–2.2)
ALP SERPL-CCNC: 56 IU/L (ref 44–121)
ALT SERPL-CCNC: 22 IU/L (ref 0–44)
AST SERPL-CCNC: 19 IU/L (ref 0–40)
BILIRUB SERPL-MCNC: 0.3 MG/DL (ref 0–1.2)
BUN SERPL-MCNC: 18 MG/DL (ref 6–20)
BUN/CREAT SERPL: 18 (ref 9–20)
CALCIUM SERPL-MCNC: 9.4 MG/DL (ref 8.7–10.2)
CHLORIDE SERPL-SCNC: 101 MMOL/L (ref 96–106)
CHOLEST SERPL-MCNC: 137 MG/DL (ref 100–199)
CO2 SERPL-SCNC: 27 MMOL/L (ref 20–29)
CREAT SERPL-MCNC: 1.01 MG/DL (ref 0.76–1.27)
EGFR: 97 ML/MIN/1.73
GLOBULIN SER CALC-MCNC: 2.2 G/DL (ref 1.5–4.5)
GLUCOSE SERPL-MCNC: 98 MG/DL (ref 65–99)
HDLC SERPL-MCNC: 45 MG/DL
IMP & REVIEW OF LAB RESULTS: NORMAL
LDLC SERPL CALC-MCNC: 77 MG/DL (ref 0–99)
POTASSIUM SERPL-SCNC: 4 MMOL/L (ref 3.5–5.2)
PROT SERPL-MCNC: 6.8 G/DL (ref 6–8.5)
SODIUM SERPL-SCNC: 142 MMOL/L (ref 134–144)
TRIGL SERPL-MCNC: 73 MG/DL (ref 0–149)
VLDLC SERPL CALC-MCNC: 15 MG/DL (ref 5–40)

## 2022-08-22 DIAGNOSIS — M62.830 BACK MUSCLE SPASM: ICD-10-CM

## 2022-08-22 RX ORDER — MELOXICAM 7.5 MG/1
TABLET ORAL
Qty: 90 TABLET | Refills: 0 | Status: SHIPPED | OUTPATIENT
Start: 2022-08-22

## 2022-11-16 DIAGNOSIS — I10 ESSENTIAL HYPERTENSION: ICD-10-CM

## 2022-11-16 RX ORDER — CHLORTHALIDONE 25 MG/1
TABLET ORAL
Qty: 90 TABLET | Refills: 1 | OUTPATIENT
Start: 2022-11-16

## 2022-11-16 NOTE — TELEPHONE ENCOUNTER
6/7/2022 chlorthalidone 90-day supply +1 refill. Follow-up appointment 12/9/2022-will refill at that time.     Requested Prescriptions     Refused Prescriptions Disp Refills    chlorthalidone (HYGROTON) 25 mg tablet [Pharmacy Med Name: CHLORTHALIDONE 25 MG TABLET] 90 Tablet 1     Sig: TAKE 1 TABLET BY MOUTH EVERY DAY     Refused By: Raghavendra Weeks     Reason for Refusal: Patient has requested refill too soon

## 2022-12-02 ENCOUNTER — LAB ONLY (OUTPATIENT)
Dept: INTERNAL MEDICINE CLINIC | Age: 37
End: 2022-12-02

## 2022-12-02 DIAGNOSIS — Z00.00 LABORATORY TESTS ORDERED AS PART OF A COMPLETE PHYSICAL EXAM (CPE): Primary | ICD-10-CM

## 2022-12-03 LAB
ALBUMIN SERPL-MCNC: 4.9 G/DL (ref 4–5)
ALBUMIN/GLOB SERPL: 2 {RATIO} (ref 1.2–2.2)
ALP SERPL-CCNC: 54 IU/L (ref 44–121)
ALT SERPL-CCNC: 45 IU/L (ref 0–44)
AST SERPL-CCNC: 22 IU/L (ref 0–40)
BILIRUB SERPL-MCNC: 0.6 MG/DL (ref 0–1.2)
BUN SERPL-MCNC: 15 MG/DL (ref 6–20)
BUN/CREAT SERPL: 16 (ref 9–20)
CALCIUM SERPL-MCNC: 9.5 MG/DL (ref 8.7–10.2)
CHLORIDE SERPL-SCNC: 97 MMOL/L (ref 96–106)
CHOLEST SERPL-MCNC: 163 MG/DL (ref 100–199)
CO2 SERPL-SCNC: 28 MMOL/L (ref 20–29)
CREAT SERPL-MCNC: 0.94 MG/DL (ref 0.76–1.27)
EGFR: 106 ML/MIN/1.73
GLOBULIN SER CALC-MCNC: 2.4 G/DL (ref 1.5–4.5)
GLUCOSE SERPL-MCNC: 83 MG/DL (ref 70–99)
HDLC SERPL-MCNC: 44 MG/DL
IMP & REVIEW OF LAB RESULTS: NORMAL
LDLC SERPL CALC-MCNC: 96 MG/DL (ref 0–99)
POTASSIUM SERPL-SCNC: 4.2 MMOL/L (ref 3.5–5.2)
PROT SERPL-MCNC: 7.3 G/DL (ref 6–8.5)
SODIUM SERPL-SCNC: 138 MMOL/L (ref 134–144)
TRIGL SERPL-MCNC: 131 MG/DL (ref 0–149)
VLDLC SERPL CALC-MCNC: 23 MG/DL (ref 5–40)

## 2022-12-09 ENCOUNTER — OFFICE VISIT (OUTPATIENT)
Dept: INTERNAL MEDICINE CLINIC | Age: 37
End: 2022-12-09
Payer: COMMERCIAL

## 2022-12-09 VITALS
SYSTOLIC BLOOD PRESSURE: 128 MMHG | TEMPERATURE: 96.6 F | WEIGHT: 199 LBS | RESPIRATION RATE: 18 BRPM | OXYGEN SATURATION: 98 % | DIASTOLIC BLOOD PRESSURE: 80 MMHG | HEART RATE: 92 BPM | BODY MASS INDEX: 28.49 KG/M2 | HEIGHT: 70 IN

## 2022-12-09 DIAGNOSIS — H52.13 MYOPIA OF BOTH EYES: ICD-10-CM

## 2022-12-09 DIAGNOSIS — Z23 NEEDS FLU SHOT: ICD-10-CM

## 2022-12-09 DIAGNOSIS — G89.29 CHRONIC RIGHT-SIDED LOW BACK PAIN WITHOUT SCIATICA: ICD-10-CM

## 2022-12-09 DIAGNOSIS — I10 ESSENTIAL HYPERTENSION: ICD-10-CM

## 2022-12-09 DIAGNOSIS — Z00.00 LABORATORY TESTS ORDERED AS PART OF A COMPLETE PHYSICAL EXAM (CPE): ICD-10-CM

## 2022-12-09 DIAGNOSIS — Z00.00 ANNUAL PHYSICAL EXAM: Primary | ICD-10-CM

## 2022-12-09 DIAGNOSIS — M54.50 CHRONIC RIGHT-SIDED LOW BACK PAIN WITHOUT SCIATICA: ICD-10-CM

## 2022-12-09 DIAGNOSIS — H40.003 GLAUCOMA SUSPECT OF BOTH EYES: ICD-10-CM

## 2022-12-09 DIAGNOSIS — M62.830 BACK MUSCLE SPASM: ICD-10-CM

## 2022-12-09 DIAGNOSIS — H47.099: ICD-10-CM

## 2022-12-09 DIAGNOSIS — Z23 ENCOUNTER FOR IMMUNIZATION: ICD-10-CM

## 2022-12-09 DIAGNOSIS — J30.89 ENVIRONMENTAL AND SEASONAL ALLERGIES: ICD-10-CM

## 2022-12-09 DIAGNOSIS — E78.2 MIXED HYPERLIPIDEMIA: ICD-10-CM

## 2022-12-09 RX ORDER — CHLORTHALIDONE 25 MG/1
25 TABLET ORAL DAILY
Qty: 93 TABLET | Refills: 3 | Status: SHIPPED | OUTPATIENT
Start: 2022-12-09

## 2022-12-09 RX ORDER — ATORVASTATIN CALCIUM 20 MG/1
20 TABLET, FILM COATED ORAL DAILY
Qty: 93 TABLET | Refills: 3 | Status: SHIPPED | OUTPATIENT
Start: 2022-12-09

## 2022-12-09 NOTE — PATIENT INSTRUCTIONS
Vaccine Information Statement    Influenza (Flu) Vaccine (Inactivated or Recombinant): What You Need to Know    Many vaccine information statements are available in Slovak and other languages. See www.immunize.org/vis. Hojas de información sobre vacunas están disponibles en español y en muchos otros idiomas. Visite www.immunize.org/vis. 1. Why get vaccinated? Influenza vaccine can prevent influenza (flu). Flu is a contagious disease that spreads around the [facility] every year, usually between October and May. Anyone can get the flu, but it is more dangerous for some people. Infants and young children, people 72 years and older, pregnant people, and people with certain health conditions or a weakened immune system are at greatest risk of flu complications. Pneumonia, bronchitis, sinus infections, and ear infections are examples of flu-related complications. If you have a medical condition, such as heart disease, cancer, or diabetes, flu can make it worse. Flu can cause fever and chills, sore throat, muscle aches, fatigue, cough, headache, and runny or stuffy nose. Some people may have vomiting and diarrhea, though this is more common in children than adults. In an average year, thousands of people in the [facility] die from flu, and many more are hospitalized. Flu vaccine prevents millions of illnesses and flu-related visits to the doctor each year. 2. Influenza vaccines     CDC recommends everyone 6 months and older get vaccinated every flu season. Children 6 months through 6years of age may need 2 doses during a single flu season. Everyone else needs only 1 dose each flu season. It takes about 2 weeks for protection to develop after vaccination. There are many flu viruses, and they are always changing. Each year a new flu vaccine is made to protect against the influenza viruses believed to be likely to cause disease in the upcoming flu season.  Even when the vaccine doesnt exactly match these viruses, it may still provide some protection. Influenza vaccine does not cause flu. Influenza vaccine may be given at the same time as other vaccines. 3. Talk with your health care provider    Tell your vaccination provider if the person getting the vaccine:  Has had an allergic reaction after a previous dose of influenza vaccine, or has any severe, life-threatening allergies   Has ever had Guillain-Barré Syndrome (also called GBS)    In some cases, your health care provider may decide to postpone influenza vaccination until a future visit. Influenza vaccine can be administered at any time during pregnancy. People who are or will be pregnant during influenza season should receive inactivated influenza vaccine. People with minor illnesses, such as a cold, may be vaccinated. People who are moderately or severely ill should usually wait until they recover before getting influenza vaccine. Your health care provider can give you more information. 4. Risks of a vaccine reaction    Soreness, redness, and swelling where the shot is given, fever, muscle aches, and headache can happen after influenza vaccination. There may be a very small increased risk of Guillain-Barré Syndrome (GBS) after inactivated influenza vaccine (the flu shot). [facility] children who get the flu shot along with pneumococcal vaccine (PCV13) and/or DTaP vaccine at the same time might be slightly more likely to have a seizure caused by fever. Tell your health care provider if a child who is getting flu vaccine has ever had a seizure. People sometimes faint after medical procedures, including vaccination. Tell your provider if you feel dizzy or have vision changes or ringing in the ears. As with any medicine, there is a very remote chance of a vaccine causing a severe allergic reaction, other serious injury, or death. 5. What if there is a serious problem?     An allergic reaction could occur after the vaccinated person leaves the clinic. If you see signs of a severe allergic reaction (hives, swelling of the face and throat, difficulty breathing, a fast heartbeat, dizziness, or weakness), call 9-1-1 and get the person to the nearest hospital.    For other signs that concern you, call your health care provider. Adverse reactions should be reported to the Vaccine Adverse Event Reporting System (VAERS). Your health care provider will usually file this report, or you can do it yourself. Visit the VAERS website at www.vaers. [facility].gov or call 9-822.420.6812. VAERS is only for reporting reactions, and VAERS staff members do not give medical advice. 6. The National Vaccine Injury Compensation Program    The [facility] Vaccine Injury Compensation Program (VICP) is a federal program that was created to compensate people who may have been injured by certain vaccines. Claims regarding alleged injury or death due to vaccination have a time limit for filing, which may be as short as two years. Visit the VICP website at www.[facility].gov/vaccinecompensation or call 4-870.534.2338 to learn about the program and about filing a claim. 7. How can I learn more? Ask your health care provider. Call your local or state health department. Visit the website of the Food and Drug Administration (FDA) for vaccine package inserts and additional information at www.fda.gov/vaccines-blood-biologics/vaccines. Contact the Centers for Disease Control and Prevention (CDC): Call 5-401.605.2390 (1-800-CDC-INFO) or  Visit CDCs influenza website at www.cdc.gov/flu. Vaccine Information Statement   Inactivated Influenza Vaccine   8/6/2021  42 FRANKIE Basurto [id number]   Department of Health and Human Services  Centers for Disease Control and Prevention    Office Use Only      Vaccine Information Statement    Tdap (Tetanus, Diphtheria, Pertussis) Vaccine: What You Need to Know     Many vaccine information statements are available in [place]o and other languages. See www.immunize.org/vis. Hojas de información sobre vacunas están disponibles en español y en muchos otros idiomas. Visite www.immunize.org/vis. 1. Why get vaccinated? Tdap vaccine can prevent tetanus, diphtheria, and pertussis. Diphtheria and pertussis spread from person to person. Tetanus enters the body through cuts or wounds. TETANUS (T) causes painful stiffening of the muscles. Tetanus can lead to serious health problems, including being unable to open the mouth, having trouble swallowing and breathing, or death. DIPHTHERIA (D) can lead to difficulty breathing, heart failure, paralysis, or death. PERTUSSIS (aP), also known as whooping cough, can cause uncontrollable, violent coughing that makes it hard to breathe, eat, or drink. Pertussis can be extremely serious especially in babies and young children, causing pneumonia, convulsions, brain damage, or death. In teens and adults, it can cause weight loss, loss of bladder control, passing out, and rib fractures from severe coughing. 2. Tdap vaccine     Tdap is only for children 7 years and older, adolescents, and adults. Adolescents should receive a single dose of Tdap, preferably at age 6 or 15 years. Pregnant people should get a dose of Tdap during every pregnancy, preferably during the early part of the third trimester, to help protect the  from pertussis. Infants are most at risk for severe, life-threatening complications from pertussis. Adults who have never received Tdap should get a dose of Tdap. Also, adults should receive a booster dose of either Tdap or Td (a different vaccine that protects against tetanus and diphtheria but not pertussis) every 10 years, or after 5 years in the case of a severe or dirty wound or burn. Tdap may be given at the same time as other vaccines. 3. Talk with your health care provider    Tell your vaccination provider if the person getting the vaccine:   Has had an allergic reaction after a previous dose of any vaccine that protects against tetanus, diphtheria, or pertussis, or has any severe, life-threatening allergies   Has had a coma, decreased level of consciousness, or prolonged seizures within 7 days after a previous dose of any pertussis vaccine (DTP, DTaP, or Tdap)  Has seizures or another nervous system problem  Has ever had Guillain-Barré Syndrome (also called GBS)  Has had severe pain or swelling after a previous dose of any vaccine that protects against tetanus or diphtheria    In some cases, your health care provider may decide to postpone Tdap vaccination until a future visit. People with minor illnesses, such as a cold, may be vaccinated. People who are moderately or severely ill should usually wait until they recover before getting Tdap vaccine. Your health care provider can give you more information. 4. Risks of a vaccine reaction    Pain, redness, or swelling where the shot was given, mild fever, headache, feeling tired, and nausea, vomiting, diarrhea, or stomachache sometimes happen after Tdap vaccination. People sometimes faint after medical procedures, including vaccination. Tell your provider if you feel dizzy or have vision changes or ringing in the ears. As with any medicine, there is a very remote chance of a vaccine causing a severe allergic reaction, other serious injury, or death. 5. What if there is a serious problem? An allergic reaction could occur after the vaccinated person leaves the clinic. If you see signs of a severe allergic reaction (hives, swelling of the face and throat, difficulty breathing, a fast heartbeat, dizziness, or weakness), call 9-1-1 and get the person to the nearest hospital.    For other signs that concern you, call your health care provider. Adverse reactions should be reported to the Vaccine Adverse Event Reporting System (VAERS).  Your health care provider will usually file this report, or you can do it yourself. Visit the VAERS website at www.vaers. [facility].gov or call 4-220.636.3826. VAERS is only for reporting reactions, and VAERS staff members do not give medical advice. 6. The National Vaccine Injury Compensation Program    The [facility] Vaccine Injury Compensation Program (VICP) is a federal program that was created to compensate people who may have been injured by certain vaccines. Claims regarding alleged injury or death due to vaccination have a time limit for filing, which may be as short as two years. Visit the VICP website at [facility].gov/vaccinecompensation or call 7-787.564.3895 to learn about the program and about filing a claim. 7. How can I learn more? Ask your health care provider. Call your local or state health department. Visit the website of the Food and Drug Administration (FDA) for vaccine package inserts and additional information at www.fda.gov/vaccines-blood-biologics/vaccines. Contact the Centers for Disease Control and Prevention (CDC): Call 3-542.537.4111 (1-800-CDC-INFO) or  Visit CDCs website at www.cdc.gov/vaccines. Vaccine Information Statement   Tdap (Tetanus, Diphtheria, Pertussis) Vaccine  8/6/2021  42 [facility] [id number]   Department of Health and Human Services  Centers for Disease Control and Prevention    Office Use Only

## 2022-12-09 NOTE — PROGRESS NOTES
Chief Complaint   Patient presents with    Hypertension     6 month f/u    Cholesterol Problem    Labs     Completed 12/2/2022     1. \"Have you been to the ER, urgent care clinic since your last visit? Hospitalized since your last visit? \" No    2. \"Have you seen or consulted any other health care providers outside of the [address] [place] [address] since your last visit? \" No     3. For patients aged 39-70: Has the patient had a colonoscopy / FIT/ Cologuard? NA - based on age      If the patient is female:    4. For patients aged 41-77: Has the patient had a mammogram within the past 2 years? NA - based on age or sex      11. For patients aged 21-65: Has the patient had a pap smear?  NA - based on age or sex

## 2022-12-09 NOTE — PROGRESS NOTES
Reason for Visit:    Chief Complaint   Patient presents with    Hypertension     6 month f/u    Cholesterol Problem    Labs     Completed 12/2/2022       HPI:  Chago Jennings presents today for annual physical.    HTN. Tolerating chlorthalidone without side effects. Does not check blood pressures at home. Cholesterol. Tolerating statin therapy without myalgia. Seasonal allergies. Not requiring saline spray. Takes Claritin as needed. Chronic back pain with muscle spasm. Takes Flexeril and meloxicam as needed. Does not need refills at this time. Eyes. [facility] in October and was informed he had enlarged optic nerve. He does not remember which eye is affected. His pressure levels remain slightly elevated. He will be seeing the specialist Dr. Carole Phillips on 1/3/2023.     Problem List:  Patient Active Problem List   Diagnosis Code    Eye movement abnormality H51.9    Memory difficulties R41.3    Dizziness R42    Environmental and seasonal allergies J30.89    Essential hypertension I10    Mixed hyperlipidemia E78.2    Glaucoma suspect of both eyes H40.003    Regular astigmatism of both eyes H52.223    Myopia of both eyes H52.13    Chronic right-sided low back pain without sciatica M54.50, G89.29    Previous back surgery Z98.890    Back muscle spasm M62.830       Past Medical History:    Past Medical History:   Diagnosis Date    Back muscle spasm 6/17/2022    Chronic right-sided low back pain without sciatica 6/17/2022       Past Surgical History:  Past Surgical History:   Procedure Laterality Date    HX APPENDECTOMY  1998    HX BACK SURGERY  2018       Family History:  Family History   Problem Relation Age of Onset    Mult Sclerosis Mother     Glaucoma Mother     Hypertension Father     No Known Problems Brother        Immunizations:  Immunization History   Administered Date(s) Administered    COVID-19, MODERNA BLUE border, Primary or Immunocompromised, (age 18y+), IM, 100 mcg/0.5mL 02/25/2021, 03/26/2021    Influenza, FLUARIX, FLULAVAL, FLUZONE (age 10 mo+) AND AFLURIA, (age 1 y+), PF, 0.5mL 09/28/2021, 12/09/2022    Tdap 12/09/2022       Allergies:  Not on File    Current Medications:   Current Outpatient Medications:     atorvastatin (LIPITOR) 20 mg tablet, Take 1 Tablet by mouth daily. , Disp: 93 Tablet, Rfl: 3    chlorthalidone (HYGROTON) 25 mg tablet, Take 1 Tablet by mouth daily. , Disp: 93 Tablet, Rfl: 3    meloxicam (MOBIC) 7.5 mg tablet, TAKE 1 TABLET BY MOUTH EVERY DAY AS NEEDED FOR PAIN, Disp: 90 Tablet, Rfl: 0    cyclobenzaprine (FLEXERIL) 10 mg tablet, Take 1 Tablet by mouth three (3) times daily as needed for Muscle Spasm(s) (Muscle spasms). , Disp: 30 Tablet, Rfl: 1    loratadine (CLARITIN PO), Take  by mouth., Disp: , Rfl:     Review of Systems:   History obtained from the patient  Psychological ROS: negative  Ophthalmic ROS: See HPI  Allergy and Immunology ROS: See HPI  Respiratory ROS: no cough, shortness of breath, or wheezing  Cardiovascular ROS: no chest pain or dyspnea on exertion  Gastrointestinal ROS: no abdominal pain, change in bowel habits, or black or bloody stools  Genito-Urinary ROS: no dysuria, trouble voiding, or hematuria  Musculoskeletal ROS: See HPI  Neurological ROS: no TIA or stroke symptoms  Dermatological ROS: negative    Vitals:  Visit Vitals  /80   Pulse 92   Temp (!) 96.6 °F (35.9 °C) (Temporal)   Resp 18   Ht 5' 10\" (1.778 m)   Wt 199 lb (90.3 kg)   SpO2 98%   BMI 28.55 kg/m²       Physical Examination:  General appearance - alert, well appearing, and in no distress  Mental status - alert, oriented to person, place, and time  Eyes - wears contacts  Ears - bilateral TM's and external ear canals normal  Mouth - mucous membranes moist, pharynx normal without lesions  Neck - supple, no significant adenopathy, carotids upstroke normal bilaterally, no bruits  Chest - clear to auscultation, no wheezes, rales or rhonchi, symmetric air entry  Heart - normal rate, regular rhythm, normal S1, S2, no murmurs, rubs, clicks or gallops  Abdomen - soft, nontender, nondistended, no masses or organomegaly  Neurological - alert, oriented, normal speech, no focal findings or movement disorder noted  Musculoskeletal - no joint tenderness, deformity or swelling  Extremities - peripheral pulses normal, no pedal edema, no clubbing or cyanosis  Skin - normal coloration and turgor, no rashes, no suspicious skin lesions noted    Lab Review: All labs good    Assessment:  Orders Placed This Encounter    Influenza Virus Vaccine QUAD, PF Syr 6 Months + (Flulaval, Fluzone, Fluarix [id number])    Tetanus, diphtheria toxoids and acellular pertussis (TDAP) vaccine, in individuals >=7 years, IM    FL IMMUNIZ ADMIN,1 SINGLE/COMB VAC/TOXOID    atorvastatin (LIPITOR) 20 mg tablet    chlorthalidone (HYGROTON) 25 mg tablet     1. Annual physical exam    2. Essential hypertension    Limit sodium in diet to 2g/d  Avoid pork  Initiate cardio exercise  Weight reduction  Increase water intake  Check BP and report readings greater than 139/89 to office    - chlorthalidone (HYGROTON) 25 mg tablet; Take 1 Tablet by mouth daily. Dispense: 93 Tablet; Refill: 3    3. Mixed hyperlipidemia  ; LDL 96    Reduce fried fatty or oily foods in diet  Limit red meats, processed foods, and alcohol. Limit fast food  Work on weight reduction  Increase water intake    - atorvastatin (LIPITOR) 20 mg tablet; Take 1 Tablet by mouth daily. Dispense: 93 Tablet; Refill: 3    4. Environmental and seasonal allergies    Avoid allergens (strong smells, animals, cigarettes or carpeted areas)  Use Saline spray as needed    5. Chronic right-sided low back pain without sciatica  Continue mobic as needed    6. Back muscle spasm  Continue Flexeril as needed    7. Optic nerve asymmetry, unspecified laterality  Specialist managed condition is being evaluated/managed by Dr. Pool Stevens. No acute findings today meriting change in management plan. 8. Myopia of both eyes    9. Glaucoma suspect of both eyes    10. Encounter for immunization    - TDAP, BOOSTRIX, (AGE 10 YRS+), IM  - ND IMMUNIZ ADMIN,1 SINGLE/COMB VAC/TOXOID    11. Needs flu shot    - INFLUENZA, FLUARIX, FLULAVAL, FLUZONE (AGE 6 MO+), AFLURIA(AGE 3Y+) IM, PF, 0.5 ML    Plan:  1. Continue current meds as prescribed. Follow up: Follow-up and Dispositions    Return in about 1 year (around 12/9/2023) for Physical, Lab-fasting. (CBC, CMP, TSH, lipid)    2. Colonoscopy due:  n/a  3. PSA due: n/a  4.   Immunizations due: Flu and TDap today      Shaheen Sumner DNP

## 2022-12-09 NOTE — PROGRESS NOTES
Milagro Dakota 1985 male who presents for routine immunizations. Patient denies any symptoms , reactions or allergies that would exclude them from being immunized today. Risks and adverse reactions were discussed and the VIS was given to them. All questions were addressed. Order placed for INFLUENZA in LEFT deltoid and TDAP in RIGHT deltoid, per Verbal Order from Xiomara Flynn, [address], with read back. Patient was observed for 15 min post injection. There were no reactions observed.     [facility] NDIAYE

## 2023-02-04 DIAGNOSIS — I10 ESSENTIAL HYPERTENSION: Primary | ICD-10-CM

## 2023-02-04 DIAGNOSIS — Z00.00 LABORATORY TESTS ORDERED AS PART OF A COMPLETE PHYSICAL EXAM (CPE): ICD-10-CM

## 2023-02-05 DIAGNOSIS — E78.2 MIXED HYPERLIPIDEMIA: Primary | ICD-10-CM

## 2023-02-05 DIAGNOSIS — Z00.00 LABORATORY TESTS ORDERED AS PART OF A COMPLETE PHYSICAL EXAM (CPE): Primary | ICD-10-CM

## 2023-02-05 DIAGNOSIS — Z00.00 LABORATORY TESTS ORDERED AS PART OF A COMPLETE PHYSICAL EXAM (CPE): ICD-10-CM

## 2023-02-06 DIAGNOSIS — Z00.00 LABORATORY TESTS ORDERED AS PART OF A COMPLETE PHYSICAL EXAM (CPE): Primary | ICD-10-CM

## 2023-12-04 ENCOUNTER — NURSE ONLY (OUTPATIENT)
Age: 38
End: 2023-12-04

## 2023-12-04 DIAGNOSIS — I10 ESSENTIAL HYPERTENSION: ICD-10-CM

## 2023-12-04 DIAGNOSIS — E78.2 MIXED HYPERLIPIDEMIA: ICD-10-CM

## 2023-12-04 DIAGNOSIS — Z00.00 LABORATORY TESTS ORDERED AS PART OF A COMPLETE PHYSICAL EXAM (CPE): ICD-10-CM

## 2023-12-05 LAB
ALBUMIN SERPL-MCNC: 4.7 G/DL (ref 4.1–5.1)
ALBUMIN/GLOB SERPL: 1.8 {RATIO} (ref 1.2–2.2)
ALP SERPL-CCNC: 60 IU/L (ref 44–121)
ALT SERPL-CCNC: 32 IU/L (ref 0–44)
AST SERPL-CCNC: 19 IU/L (ref 0–40)
BASOPHILS # BLD AUTO: 0.1 X10E3/UL (ref 0–0.2)
BASOPHILS NFR BLD AUTO: 1 %
BILIRUB SERPL-MCNC: 0.6 MG/DL (ref 0–1.2)
BUN SERPL-MCNC: 16 MG/DL (ref 6–24)
BUN/CREAT SERPL: 15 (ref 9–20)
CALCIUM SERPL-MCNC: 10 MG/DL (ref 8.7–10.2)
CHLORIDE SERPL-SCNC: 99 MMOL/L (ref 96–106)
CHOLEST SERPL-MCNC: 155 MG/DL (ref 100–199)
CO2 SERPL-SCNC: 26 MMOL/L (ref 20–29)
CREAT SERPL-MCNC: 1.04 MG/DL (ref 0.76–1.27)
EGFRCR SERPLBLD CKD-EPI 2021: 93 ML/MIN/1.73
EOSINOPHIL # BLD AUTO: 0.3 X10E3/UL (ref 0–0.4)
EOSINOPHIL NFR BLD AUTO: 3 %
ERYTHROCYTE [DISTWIDTH] IN BLOOD BY AUTOMATED COUNT: 12.3 % (ref 11.6–15.4)
GLOBULIN SER CALC-MCNC: 2.6 G/DL (ref 1.5–4.5)
GLUCOSE SERPL-MCNC: 100 MG/DL (ref 70–99)
HCT VFR BLD AUTO: 49.7 % (ref 37.5–51)
HDLC SERPL-MCNC: 49 MG/DL
HGB BLD-MCNC: 17.6 G/DL (ref 13–17.7)
IMM GRANULOCYTES # BLD AUTO: 0 X10E3/UL (ref 0–0.1)
IMM GRANULOCYTES NFR BLD AUTO: 0 %
LDLC SERPL CALC-MCNC: 87 MG/DL (ref 0–99)
LYMPHOCYTES # BLD AUTO: 1.9 X10E3/UL (ref 0.7–3.1)
LYMPHOCYTES NFR BLD AUTO: 21 %
MCH RBC QN AUTO: 31.1 PG (ref 26.6–33)
MCHC RBC AUTO-ENTMCNC: 35.4 G/DL (ref 31.5–35.7)
MCV RBC AUTO: 88 FL (ref 79–97)
MONOCYTES # BLD AUTO: 0.8 X10E3/UL (ref 0.1–0.9)
MONOCYTES NFR BLD AUTO: 9 %
NEUTROPHILS # BLD AUTO: 5.9 X10E3/UL (ref 1.4–7)
NEUTROPHILS NFR BLD AUTO: 66 %
PLATELET # BLD AUTO: 214 X10E3/UL (ref 150–450)
POTASSIUM SERPL-SCNC: 3.9 MMOL/L (ref 3.5–5.2)
PROT SERPL-MCNC: 7.3 G/DL (ref 6–8.5)
RBC # BLD AUTO: 5.66 X10E6/UL (ref 4.14–5.8)
SODIUM SERPL-SCNC: 141 MMOL/L (ref 134–144)
SPECIMEN STATUS REPORT: NORMAL
TRIGL SERPL-MCNC: 104 MG/DL (ref 0–149)
TSH SERPL DL<=0.005 MIU/L-ACNC: 0.98 UIU/ML (ref 0.45–4.5)
VLDLC SERPL CALC-MCNC: 19 MG/DL (ref 5–40)
WBC # BLD AUTO: 9 X10E3/UL (ref 3.4–10.8)

## 2023-12-11 ENCOUNTER — OFFICE VISIT (OUTPATIENT)
Age: 38
End: 2023-12-11
Payer: COMMERCIAL

## 2023-12-11 VITALS
HEART RATE: 87 BPM | HEIGHT: 70 IN | BODY MASS INDEX: 29.06 KG/M2 | OXYGEN SATURATION: 97 % | RESPIRATION RATE: 16 BRPM | WEIGHT: 203 LBS | DIASTOLIC BLOOD PRESSURE: 72 MMHG | SYSTOLIC BLOOD PRESSURE: 118 MMHG

## 2023-12-11 DIAGNOSIS — M54.50 CHRONIC RIGHT-SIDED LOW BACK PAIN WITHOUT SCIATICA: ICD-10-CM

## 2023-12-11 DIAGNOSIS — G89.29 CHRONIC RIGHT-SIDED LOW BACK PAIN WITHOUT SCIATICA: ICD-10-CM

## 2023-12-11 DIAGNOSIS — H40.1330 PIGMENTARY GLAUCOMA OF BOTH EYES, UNSPECIFIED GLAUCOMA STAGE: ICD-10-CM

## 2023-12-11 DIAGNOSIS — M72.2 PLANTAR FASCIITIS, LEFT: ICD-10-CM

## 2023-12-11 DIAGNOSIS — Z00.00 ANNUAL PHYSICAL EXAM: Primary | ICD-10-CM

## 2023-12-11 DIAGNOSIS — I10 ESSENTIAL HYPERTENSION: ICD-10-CM

## 2023-12-11 DIAGNOSIS — E78.2 MIXED HYPERLIPIDEMIA: ICD-10-CM

## 2023-12-11 PROBLEM — H40.003 GLAUCOMA SUSPECT OF BOTH EYES: Status: RESOLVED | Noted: 2021-07-08 | Resolved: 2023-12-11

## 2023-12-11 PROBLEM — Z98.890 PREVIOUS BACK SURGERY: Status: RESOLVED | Noted: 2022-06-17 | Resolved: 2023-12-11

## 2023-12-11 PROBLEM — R42 DIZZINESS: Status: RESOLVED | Noted: 2021-05-24 | Resolved: 2023-12-11

## 2023-12-11 PROBLEM — H51.9 EYE MOVEMENT ABNORMALITY: Status: RESOLVED | Noted: 2021-05-24 | Resolved: 2023-12-11

## 2023-12-11 PROBLEM — M62.830 BACK MUSCLE SPASM: Status: RESOLVED | Noted: 2022-06-17 | Resolved: 2023-12-11

## 2023-12-11 PROCEDURE — 99396 PREV VISIT EST AGE 40-64: CPT | Performed by: NURSE PRACTITIONER

## 2023-12-11 PROCEDURE — 3078F DIAST BP <80 MM HG: CPT | Performed by: NURSE PRACTITIONER

## 2023-12-11 PROCEDURE — 3074F SYST BP LT 130 MM HG: CPT | Performed by: NURSE PRACTITIONER

## 2023-12-11 RX ORDER — CHLORTHALIDONE 25 MG/1
25 TABLET ORAL DAILY
Qty: 93 TABLET | Refills: 3 | Status: SHIPPED | OUTPATIENT
Start: 2023-12-11

## 2023-12-11 RX ORDER — CYCLOBENZAPRINE HCL 10 MG
10 TABLET ORAL 3 TIMES DAILY PRN
Qty: 90 TABLET | Refills: 0 | Status: SHIPPED | OUTPATIENT
Start: 2023-12-11

## 2023-12-11 RX ORDER — ATORVASTATIN CALCIUM 20 MG/1
20 TABLET, FILM COATED ORAL DAILY
Qty: 93 TABLET | Refills: 3 | Status: SHIPPED | OUTPATIENT
Start: 2023-12-11

## 2023-12-11 RX ORDER — TIMOLOL 5.12 MG/ML
1 SOLUTION/ DROPS OPHTHALMIC DAILY
Qty: 10 ML | Refills: 4
Start: 2023-12-11

## 2023-12-11 NOTE — PROGRESS NOTES
[facility]      12/11/2023    Gayla Rizo 1985 is a pleasant White (non-) male for CPE. Reason for Visit:    Chief Complaint   Patient presents with    Annual Exam       Todays concern/HPI:  HTN. Tolerating chlorthalidone without side effects. Does not check blood pressures at home. Cholesterol. Tolerating statin therapy without myalgia. Seasonal allergies. Not requiring saline spray. Takes Claritin as needed. Chronic back pain with muscle spasm. Takes Flexeril and meloxicam as needed. Had issues with back a few months ago and put him out of work x1 week. This occurs a few times per year. Seeking refills of flexeril. Glaucoma. Tried procedure, not effective. Eye drops now. Plantar fascitis. Left. Using arch support, effective. Does not stretch prior to activity. No complaints or concerns. Denies CP, SOB, GI/ issues, dizziness, fatigue. Tolerating medications w/o side effects.       Vitals:    12/11/23 0824   BP: 118/72   Pulse: 87   Resp: 16   SpO2: 97%         Physical Examination:  General appearance - overweight and well hydrated  Eyes - wears glasses  Ears - bilateral TM's and external ear canals normal  Nose - normal and patent, no erythema, discharge or polyps  Mouth - mucous membranes moist, pharynx normal without lesions  Neck - supple, no significant adenopathy  Lymphatics - no palpable lymphadenopathy, no hepatosplenomegaly  Chest - clear to auscultation, no wheezes, rales or rhonchi, symmetric air entry  Heart - normal rate, regular rhythm, normal S1, S2, no murmurs, rubs, clicks or gallops  Abdomen - soft, nontender, nondistended, no masses or organomegaly  Back exam - full range of motion, no tenderness, palpable spasm or pain on motion  Neurological - alert, oriented, normal speech, no focal findings or movement disorder noted  Musculoskeletal - no joint tenderness, deformity or swelling  Extremities - peripheral pulses normal, no pedal edema, no

## 2023-12-11 NOTE — ASSESSMENT & PLAN NOTE
Monitored by specialist- no acute findings meriting change in the plan  [facility]  Cont timolol drops

## 2023-12-11 NOTE — ASSESSMENT & PLAN NOTE
Plantar fasciitis is inflammation tissue that runs across the bottom of the foot. Symptoms include stabbing pain near the heel. Pain might be worst in the morning and lessens as the morning goes on. Treatments include home therapy (Every morning take a tennis ball, place on the floor and run your foot across the ball to allow it to roll/massage the sole of the foot from the toes to the heel), shoe inserts, steroid injections, and surgery if the case is severe.

## 2023-12-11 NOTE — ASSESSMENT & PLAN NOTE
Well-controlled, continue current medications  Limit sodium in diet to 2g/d  Initiate cardio exercise  Weight reduction  Increase water intake  Check BP and report if 3 consecutive readings greater than 139/89 to office

## 2023-12-11 NOTE — ASSESSMENT & PLAN NOTE
Lab Reviewed with Patient:  CMP and CBC ok  ; LDL 87  TSH 0.98    Health Maintenance:   Colonoscopy:  n/a  PSA: n/a  Immunizations: Hep B; Covid-local pharm.    LDCT: n/a  Eye Exam: 10/2024    Specialist:  [facility]

## 2024-01-10 PROBLEM — Z00.00 ANNUAL PHYSICAL EXAM: Status: RESOLVED | Noted: 2023-12-11 | Resolved: 2024-01-10

## 2024-01-24 ENCOUNTER — OFFICE VISIT (OUTPATIENT)
Age: 39
End: 2024-01-24
Payer: COMMERCIAL

## 2024-01-24 ENCOUNTER — HOSPITAL ENCOUNTER (OUTPATIENT)
Facility: HOSPITAL | Age: 39
Discharge: HOME | End: 2024-01-27

## 2024-01-24 VITALS
RESPIRATION RATE: 16 BRPM | WEIGHT: 203 LBS | DIASTOLIC BLOOD PRESSURE: 80 MMHG | SYSTOLIC BLOOD PRESSURE: 112 MMHG | HEIGHT: 70 IN | HEART RATE: 99 BPM | BODY MASS INDEX: 29.06 KG/M2 | OXYGEN SATURATION: 99 % | TEMPERATURE: 98.3 F

## 2024-01-24 DIAGNOSIS — R10.84 GENERALIZED ABDOMINAL PAIN: Primary | ICD-10-CM

## 2024-01-24 DIAGNOSIS — K21.9 GASTROESOPHAGEAL REFLUX DISEASE WITHOUT ESOPHAGITIS: ICD-10-CM

## 2024-01-24 DIAGNOSIS — R10.84 GENERALIZED ABDOMINAL PAIN: ICD-10-CM

## 2024-01-24 LAB — LABCORP SPECIMEN COLLECTION: NORMAL

## 2024-01-24 PROCEDURE — 3074F SYST BP LT 130 MM HG: CPT | Performed by: NURSE PRACTITIONER

## 2024-01-24 PROCEDURE — 3079F DIAST BP 80-89 MM HG: CPT | Performed by: NURSE PRACTITIONER

## 2024-01-24 PROCEDURE — 99214 OFFICE O/P EST MOD 30 MIN: CPT | Performed by: NURSE PRACTITIONER

## 2024-01-24 RX ORDER — OMEPRAZOLE 40 MG/1
40 CAPSULE, DELAYED RELEASE ORAL 2 TIMES DAILY
Qty: 60 CAPSULE | Refills: 0 | Status: SHIPPED | OUTPATIENT
Start: 2024-01-24

## 2024-01-24 SDOH — ECONOMIC STABILITY: FOOD INSECURITY: WITHIN THE PAST 12 MONTHS, YOU WORRIED THAT YOUR FOOD WOULD RUN OUT BEFORE YOU GOT MONEY TO BUY MORE.: NEVER TRUE

## 2024-01-24 SDOH — ECONOMIC STABILITY: FOOD INSECURITY: WITHIN THE PAST 12 MONTHS, THE FOOD YOU BOUGHT JUST DIDN'T LAST AND YOU DIDN'T HAVE MONEY TO GET MORE.: NEVER TRUE

## 2024-01-24 SDOH — ECONOMIC STABILITY: HOUSING INSECURITY
IN THE LAST 12 MONTHS, WAS THERE A TIME WHEN YOU DID NOT HAVE A STEADY PLACE TO SLEEP OR SLEPT IN A SHELTER (INCLUDING NOW)?: NO

## 2024-01-24 SDOH — ECONOMIC STABILITY: INCOME INSECURITY: HOW HARD IS IT FOR YOU TO PAY FOR THE VERY BASICS LIKE FOOD, HOUSING, MEDICAL CARE, AND HEATING?: NOT HARD AT ALL

## 2024-01-24 ASSESSMENT — PATIENT HEALTH QUESTIONNAIRE - PHQ9
2. FEELING DOWN, DEPRESSED OR HOPELESS: 0
SUM OF ALL RESPONSES TO PHQ QUESTIONS 1-9: 0
SUM OF ALL RESPONSES TO PHQ QUESTIONS 1-9: 0
1. LITTLE INTEREST OR PLEASURE IN DOING THINGS: 0
SUM OF ALL RESPONSES TO PHQ QUESTIONS 1-9: 0
SUM OF ALL RESPONSES TO PHQ QUESTIONS 1-9: 0
SUM OF ALL RESPONSES TO PHQ9 QUESTIONS 1 & 2: 0

## 2024-01-24 NOTE — PROGRESS NOTES
[facility] [address]  [address]  [place], [place] [address]  [phone] office/[phone] fax    1/24/2024    Severo Karimi 1985 is a pleasant White (non-) male.     Todays concern/HPI:  Sunday watching football game. Drinking and eating. Hx of infreq reflux. Usually takes tums and sx resolve or even resolve on their own. That night up all night with belching, heart burn. Left work early on Monday and home yesterday due to bloated, heartburn, belching. No n/v/d. Appetite is fair. LBM 1/23/24. Normal pattern. Pain is mobile-mid belly to across belly. Terrible regurgitation. Tried omeprazole, tums, and zantac didn't work.  Discomfort worse when standing. Minimal discomfort when sitting.       Review of Systems - Negative except above    Physical:   /80 (Site: Left Upper Arm, Position: Sitting, Cuff Size: Large Adult)   Pulse 99   Temp 98.3 °F (36.8 °C) (Temporal)   Resp 16   Ht 1.778 m (5' 10\")   Wt 92.1 kg (203 lb)   SpO2 99%   BMI 29.13 kg/m²     Exam:   Physical Exam  Constitutional:       Appearance: Normal appearance.   Eyes:      Extraocular Movements: Extraocular movements intact.      Conjunctiva/sclera: Conjunctivae normal.   Abdominal:      General: There is distension.      Palpations: Abdomen is soft.      Tenderness: There is abdominal tenderness (lower abd and liver area). There is no guarding or rebound.      Comments: hypoactive   Musculoskeletal:         General: Normal range of motion.   Skin:     General: Skin is dry.   Neurological:      Mental Status: He is alert and oriented to person, place, and time.   Psychiatric:         Mood and Affect: Mood normal.         Body mass index is 29.13 kg/m².       Plan:    1. Generalized abdominal pain  Assessment & Plan:  Ddx:  GERD  Pancreatitis  Cholecystitis  Gastritis  Viral component    Appendectomy in 1998.  No hx of pancreatitis or cholecystitis    Placed lab and imaging orders  Initiate

## 2024-01-24 NOTE — ASSESSMENT & PLAN NOTE
Ddx:  GERD  Pancreatitis  Cholecystitis  Gastritis  Viral component    Appendectomy in 1998.  No hx of pancreatitis or cholecystitis    Placed lab and imaging orders  Initiate omeprazole 40mg BID    [place] diet (BRAT)  Stay hydrated with water  Avoid GI irritants, fried fatty foods, alcohol.    If pain worsen. Go to nearest ED.      Detail Level: Detailed Render Post-Care Instructions In Note?: no Consent: The patient's consent was obtained including but not limited to risks of crusting, scabbing, blistering, scarring, darker or lighter pigmentary change, recurrence, incomplete removal and infection. Post-Care Instructions: I reviewed with the patient in detail post-care instructions. Patient is to wear sunprotection, and avoid picking at any of the treated lesions. Pt may apply Vaseline to crusted or scabbing areas. Duration Of Freeze Thaw-Cycle (Seconds): 0

## 2024-01-25 LAB
ALBUMIN SERPL-MCNC: 4.7 G/DL (ref 4.1–5.1)
ALP SERPL-CCNC: 57 IU/L (ref 44–121)
ALT SERPL-CCNC: 28 IU/L (ref 0–44)
AST SERPL-CCNC: 20 IU/L (ref 0–40)
BILIRUB DIRECT SERPL-MCNC: 0.15 MG/DL (ref 0–0.4)
BILIRUB SERPL-MCNC: 0.5 MG/DL (ref 0–1.2)
LIPASE SERPL-CCNC: 29 U/L (ref 13–78)
PROT SERPL-MCNC: 7.2 G/DL (ref 6–8.5)

## 2024-03-13 ENCOUNTER — PATIENT MESSAGE (OUTPATIENT)
Age: 39
End: 2024-03-13

## 2024-03-13 DIAGNOSIS — J30.89 ENVIRONMENTAL AND SEASONAL ALLERGIES: Primary | ICD-10-CM

## 2024-03-13 NOTE — TELEPHONE ENCOUNTER
From: Severo Karimi  To: Sandra Das  Sent: 3/13/2024 1:46 PM EDT  Subject: Allergy medicine     Good afternoon. I was needing a prescription for some allergy medicine. Since the trees are starting to bloom I’ve been sneezing a lot. Thanks!

## 2024-03-14 RX ORDER — MONTELUKAST SODIUM 10 MG/1
10 TABLET ORAL DAILY
Qty: 30 TABLET | Refills: 3 | Status: SHIPPED | OUTPATIENT
Start: 2024-03-14

## 2024-03-14 NOTE — TELEPHONE ENCOUNTER
Over-the-counter and prescription Claritin allergy medications are the exact same thing.  Since the Claritin is not effective, I will send in Singulair 10 mg.  Take this every day.  I would also recommend that you obtain Flonase over-the-counter and use this twice a day to help give extra boost to take care of those allergies.  I hope you feel better soon.  Have a great day       Diagnosis Orders   1. Environmental and seasonal allergies  montelukast (SINGULAIR) 10 MG tablet

## 2024-12-17 ENCOUNTER — OFFICE VISIT (OUTPATIENT)
Facility: CLINIC | Age: 39
End: 2024-12-17
Payer: COMMERCIAL

## 2024-12-17 VITALS
HEART RATE: 78 BPM | HEIGHT: 70 IN | DIASTOLIC BLOOD PRESSURE: 84 MMHG | RESPIRATION RATE: 18 BRPM | WEIGHT: 201 LBS | TEMPERATURE: 98.7 F | SYSTOLIC BLOOD PRESSURE: 125 MMHG | OXYGEN SATURATION: 97 % | BODY MASS INDEX: 28.77 KG/M2

## 2024-12-17 DIAGNOSIS — G89.29 CHRONIC RIGHT-SIDED LOW BACK PAIN WITHOUT SCIATICA: ICD-10-CM

## 2024-12-17 DIAGNOSIS — M54.50 CHRONIC RIGHT-SIDED LOW BACK PAIN WITHOUT SCIATICA: ICD-10-CM

## 2024-12-17 DIAGNOSIS — H40.1330 PIGMENTARY GLAUCOMA OF BOTH EYES, UNSPECIFIED GLAUCOMA STAGE: ICD-10-CM

## 2024-12-17 DIAGNOSIS — Z00.00 ANNUAL PHYSICAL EXAM: Primary | ICD-10-CM

## 2024-12-17 DIAGNOSIS — D22.9 BENIGN SKIN MOLE: ICD-10-CM

## 2024-12-17 DIAGNOSIS — J30.89 ENVIRONMENTAL AND SEASONAL ALLERGIES: ICD-10-CM

## 2024-12-17 DIAGNOSIS — K21.9 GASTROESOPHAGEAL REFLUX DISEASE WITHOUT ESOPHAGITIS: ICD-10-CM

## 2024-12-17 DIAGNOSIS — I10 ESSENTIAL HYPERTENSION: ICD-10-CM

## 2024-12-17 DIAGNOSIS — E78.2 MIXED HYPERLIPIDEMIA: ICD-10-CM

## 2024-12-17 PROBLEM — R41.3 MEMORY DIFFICULTIES: Status: RESOLVED | Noted: 2021-05-24 | Resolved: 2024-12-17

## 2024-12-17 PROBLEM — R10.84 GENERALIZED ABDOMINAL PAIN: Status: RESOLVED | Noted: 2024-01-24 | Resolved: 2024-12-17

## 2024-12-17 PROBLEM — M72.2 PLANTAR FASCIITIS, LEFT: Status: RESOLVED | Noted: 2023-12-11 | Resolved: 2024-12-17

## 2024-12-17 PROCEDURE — 3074F SYST BP LT 130 MM HG: CPT | Performed by: NURSE PRACTITIONER

## 2024-12-17 PROCEDURE — 99396 PREV VISIT EST AGE 40-64: CPT | Performed by: NURSE PRACTITIONER

## 2024-12-17 PROCEDURE — 3079F DIAST BP 80-89 MM HG: CPT | Performed by: NURSE PRACTITIONER

## 2024-12-17 RX ORDER — CHLORTHALIDONE 25 MG/1
25 TABLET ORAL DAILY
Qty: 93 TABLET | Refills: 3 | Status: SHIPPED | OUTPATIENT
Start: 2024-12-17

## 2024-12-17 RX ORDER — ATORVASTATIN CALCIUM 20 MG/1
20 TABLET, FILM COATED ORAL DAILY
Qty: 93 TABLET | Refills: 3 | Status: SHIPPED | OUTPATIENT
Start: 2024-12-17

## 2024-12-17 RX ORDER — CYCLOBENZAPRINE HCL 10 MG
10 TABLET ORAL 3 TIMES DAILY PRN
Qty: 90 TABLET | Refills: 0 | Status: SHIPPED | OUTPATIENT
Start: 2024-12-17

## 2024-12-17 RX ORDER — EZETIMIBE 10 MG/1
10 TABLET ORAL DAILY
Qty: 90 TABLET | Refills: 1 | Status: SHIPPED | OUTPATIENT
Start: 2024-12-17

## 2024-12-17 RX ORDER — OMEPRAZOLE 40 MG/1
40 CAPSULE, DELAYED RELEASE ORAL DAILY PRN
Qty: 90 CAPSULE | Refills: 0 | Status: SHIPPED | OUTPATIENT
Start: 2024-12-17

## 2024-12-17 ASSESSMENT — PATIENT HEALTH QUESTIONNAIRE - PHQ9
SUM OF ALL RESPONSES TO PHQ QUESTIONS 1-9: 0
SUM OF ALL RESPONSES TO PHQ9 QUESTIONS 1 & 2: 0
SUM OF ALL RESPONSES TO PHQ QUESTIONS 1-9: 0
SUM OF ALL RESPONSES TO PHQ QUESTIONS 1-9: 0
2. FEELING DOWN, DEPRESSED OR HOPELESS: NOT AT ALL
1. LITTLE INTEREST OR PLEASURE IN DOING THINGS: NOT AT ALL
SUM OF ALL RESPONSES TO PHQ QUESTIONS 1-9: 0

## 2024-12-17 NOTE — ASSESSMENT & PLAN NOTE
Well-controlled, continue current medications  Continue chlorthalidone 25 mg daily  CMP WNL  Limit sodium in diet to 2g/d  Initiate cardio exercise  Weight reduction  Increase water intake  Check BP and report if 3 consecutive readings greater than 139/89 to office

## 2024-12-17 NOTE — ASSESSMENT & PLAN NOTE
Monitored by specialist- no acute findings meriting change in the plan  [facility]  Cont timolol drops    The patient has undergone two laser surgeries for eye pressure, which unfortunately did not result in any improvement.    He has been prescribed two different types of eye drops. His next appointment is scheduled for 01/2025, during which alternative treatment options will be considered if his eye pressure remains elevated.

## 2024-12-17 NOTE — PROGRESS NOTES
Severo Karimi presents today for   Chief Complaint   Patient presents with    Annual Exam     Results     Discuss Labs           \"Have you been to the ER, urgent care clinic since your last visit?  Hospitalized since your last visit?\"    NO    “Have you seen or consulted any other health care providers outside of [facility] since your last visit?”    NO          
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    [facility]  Cont timolol drops    Plantar fasciitis, left 12/11/2023    Regular astigmatism of both eyes 07/08/2021     Current Outpatient Medications   Medication Sig    ezetimibe (ZETIA) 10 MG tablet Take 1 tablet by mouth daily    atorvastatin (LIPITOR) 20 MG tablet Take 1 tablet by mouth daily    chlorthalidone (HYGROTON) 25 MG tablet Take 1 tablet by mouth daily    omeprazole (PRILOSEC) 40 MG delayed release capsule Take 1 capsule by mouth daily as needed (reflux)    cyclobenzaprine (FLEXERIL) 10 MG tablet Take 1 tablet by mouth 3 times daily as needed for Muscle spasms    montelukast (SINGULAIR) 10 MG tablet Take 1 tablet by mouth daily    timolol (BETIMOL) 0.5 % ophthalmic solution Place 1 drop into both eyes daily Prescribed by [facility]     No current facility-administered medications for this visit.       Past Surgical History:   Procedure Laterality Date    APPENDECTOMY  1998    BACK SURGERY  2018     Allergies and Intolerances:   No Known Allergies  Family History:   Family History   Problem Relation Age of Onset    Hypertension Father     No Known Problems Brother     Glaucoma Mother     Mult Sclerosis Mother      Social History:   He  reports that he has never smoked. He has never used smokeless tobacco.   Social History     Substance and Sexual Activity   Alcohol Use Yes    Alcohol/week: 3.0 standard drinks of alcohol     Immunization History:  Immunization History   Administered Date(s) Administered    COVID-19, MODERNA BLUE border, Primary or Immunocompromised, (age 12y+), IM, 100 mcg/0.5mL 02/25/2021, 03/26/2021    Influenza, FLUARIX, FLULAVAL, FLUZONE (age 6 mo+) and AFLURIA, (age 3 y+), Quadv PF, 0.5mL 09/28/2021, 12/09/2022    TDaP, ADACEL (age 10y-64y), BOOSTRIX (age 10y+), IM, 0.5mL 12/09/2022         Return in about 6 months (around 6/17/2025) for HTN,

## 2024-12-17 NOTE — ASSESSMENT & PLAN NOTE
-196; LDL 80   Continue atorvastatin 20 mg daily  Initiate Zetia 10 mg daily  Recheck level 6 months    Reduce fried fatty or oily foods in diet  Limit red meats, processed foods, and alcohol.    Limit fast food  Increase physical activity to 60 minutes of moderate intensity aerobic exercise per week.  Increase water intake  Reduce alcohol intake    How to raise HDL if low:  Consume oats, beans, legumes, olive oil, whole grains, nuts, seeds, fatty fish, and berries.  Lose weight/fat  Reduce alcohol consumption  Smoking cessation

## 2024-12-17 NOTE — ASSESSMENT & PLAN NOTE
Not requiring Singulair at this time  No refills needed  Mostly uses Singulair during the spring and summer

## 2024-12-17 NOTE — ASSESSMENT & PLAN NOTE
Specialist:  [facility]    He is due for influenza vaccine and COVID-19 vaccine, but he declined. Hepatitis B vaccine is not necessary at this time. He does not need a colonoscopy until he is 43.

## 2024-12-17 NOTE — ASSESSMENT & PLAN NOTE
He has several moles and keratosis, which are benign skin growths. He is advised to schedule a full-body skin check with a dermatologist at his convenience annually.

## 2024-12-17 NOTE — ASSESSMENT & PLAN NOTE
Patient continues to use only on and as needed basis  He is advised to reduce the frequency of omeprazole intake to once daily as needed to prevent potential magnesium depletion.  Avoid gut irritants such as spicy foods  Avoid laying down for 30 to 45 minutes after eating  Avoid excessive alcohol consumption    He did not complete CT abdomen that was ordered back in January with complaints of abdominal pain.  He states CT was scheduled for 1 week out and by the time that they came around, his abdominal pain had improved so he canceled the appointment

## 2025-01-16 PROBLEM — Z00.00 ANNUAL PHYSICAL EXAM: Status: RESOLVED | Noted: 2023-12-11 | Resolved: 2025-01-16

## 2025-01-31 DIAGNOSIS — E78.2 MIXED HYPERLIPIDEMIA: ICD-10-CM

## 2025-01-31 DIAGNOSIS — I10 ESSENTIAL HYPERTENSION: ICD-10-CM

## 2025-01-31 RX ORDER — CHLORTHALIDONE 25 MG/1
25 TABLET ORAL DAILY
Qty: 93 TABLET | Refills: 3 | Status: CANCELLED | OUTPATIENT
Start: 2025-01-31

## 2025-01-31 RX ORDER — ATORVASTATIN CALCIUM 20 MG/1
20 TABLET, FILM COATED ORAL DAILY
Qty: 93 TABLET | Refills: 3 | Status: CANCELLED | OUTPATIENT
Start: 2025-01-31

## 2025-02-04 ENCOUNTER — PATIENT MESSAGE (OUTPATIENT)
Facility: CLINIC | Age: 40
End: 2025-02-04

## 2025-02-04 DIAGNOSIS — I10 ESSENTIAL HYPERTENSION: ICD-10-CM

## 2025-02-04 DIAGNOSIS — E78.2 MIXED HYPERLIPIDEMIA: ICD-10-CM

## 2025-02-04 RX ORDER — ATORVASTATIN CALCIUM 20 MG/1
20 TABLET, FILM COATED ORAL DAILY
Qty: 93 TABLET | Refills: 3 | Status: SHIPPED | OUTPATIENT
Start: 2025-02-04

## 2025-02-04 RX ORDER — CHLORTHALIDONE 25 MG/1
25 TABLET ORAL DAILY
Qty: 93 TABLET | Refills: 3 | Status: SHIPPED | OUTPATIENT
Start: 2025-02-04

## 2025-02-04 NOTE — TELEPHONE ENCOUNTER
PCP: Sandra Das DNP    LAST OFFICE VISIT: 12/17/2024    LAST REFILL PER CHART:  Medication:chlorthalidone (HYGROTON) 25 MG tablet   Ordered On:12/17/2024  Instructions:Take 1 tablet by mouth daily   Dispense:93 tablets  Refills:3    LAST REFILL PER CHART:  Medication:atorvastatin (LIPITOR) 20 MG tablet   Ordered On:12/17/2024  Instructions:Take 1 tablet by mouth daily   Dispense:93 tablets  Refills:3      Future Appointments   Date Time Provider Department Center   6/10/2025  9:00 AM [facility] LAB VISIT [facility] DEP   6/17/2025  9:30 AM Sandra Das DNP [facility] DEP

## 2025-03-23 DIAGNOSIS — J30.89 ENVIRONMENTAL AND SEASONAL ALLERGIES: ICD-10-CM

## 2025-03-24 RX ORDER — MONTELUKAST SODIUM 10 MG/1
10 TABLET ORAL DAILY
Qty: 30 TABLET | Refills: 3 | Status: SHIPPED | OUTPATIENT
Start: 2025-03-24

## 2025-03-24 NOTE — TELEPHONE ENCOUNTER
PCP: Sandra Das DNP    LAST OFFICE VISIT: 12/17/2024    LAST REFILL PER CHART:  Medication:montelukast (SINGULAIR) 10 MG tablet   Ordered On:03/14/2024  Instructions:Take 1 tablet by mouth daily   Dispense:30 tablets  Refills:3      Future Appointments   Date Time Provider Department Center   6/10/2025  9:00 AM [facility] LAB VISIT [facility] DEP   6/17/2025  9:30 AM Sandra Das DNP [facility] DEP

## 2025-06-10 ENCOUNTER — LAB (OUTPATIENT)
Facility: CLINIC | Age: 40
End: 2025-06-10

## 2025-06-10 DIAGNOSIS — I10 ESSENTIAL HYPERTENSION: ICD-10-CM

## 2025-06-10 DIAGNOSIS — E78.2 MIXED HYPERLIPIDEMIA: ICD-10-CM

## 2025-06-11 LAB
BUN SERPL-MCNC: 15 MG/DL (ref 6–24)
BUN/CREAT SERPL: 14 (ref 9–20)
CALCIUM SERPL-MCNC: 9.8 MG/DL (ref 8.7–10.2)
CHLORIDE SERPL-SCNC: 100 MMOL/L (ref 96–106)
CHOLEST SERPL-MCNC: 144 MG/DL (ref 100–199)
CO2 SERPL-SCNC: 23 MMOL/L (ref 20–29)
CREAT SERPL-MCNC: 1.04 MG/DL (ref 0.76–1.27)
EGFRCR SERPLBLD CKD-EPI 2021: 92 ML/MIN/1.73
GLUCOSE SERPL-MCNC: 101 MG/DL (ref 70–99)
HDLC SERPL-MCNC: 42 MG/DL
LDLC SERPL CALC-MCNC: 81 MG/DL (ref 0–99)
POTASSIUM SERPL-SCNC: 3.8 MMOL/L (ref 3.5–5.2)
SODIUM SERPL-SCNC: 141 MMOL/L (ref 134–144)
TRIGL SERPL-MCNC: 114 MG/DL (ref 0–149)
VLDLC SERPL CALC-MCNC: 21 MG/DL (ref 5–40)

## 2025-06-14 SDOH — ECONOMIC STABILITY: FOOD INSECURITY: WITHIN THE PAST 12 MONTHS, YOU WORRIED THAT YOUR FOOD WOULD RUN OUT BEFORE YOU GOT MONEY TO BUY MORE.: NEVER TRUE

## 2025-06-14 SDOH — ECONOMIC STABILITY: TRANSPORTATION INSECURITY
IN THE PAST 12 MONTHS, HAS LACK OF TRANSPORTATION KEPT YOU FROM MEETINGS, WORK, OR FROM GETTING THINGS NEEDED FOR DAILY LIVING?: NO

## 2025-06-14 SDOH — ECONOMIC STABILITY: INCOME INSECURITY: IN THE LAST 12 MONTHS, WAS THERE A TIME WHEN YOU WERE NOT ABLE TO PAY THE MORTGAGE OR RENT ON TIME?: NO

## 2025-06-14 SDOH — ECONOMIC STABILITY: FOOD INSECURITY: WITHIN THE PAST 12 MONTHS, THE FOOD YOU BOUGHT JUST DIDN'T LAST AND YOU DIDN'T HAVE MONEY TO GET MORE.: NEVER TRUE

## 2025-06-14 SDOH — ECONOMIC STABILITY: TRANSPORTATION INSECURITY
IN THE PAST 12 MONTHS, HAS THE LACK OF TRANSPORTATION KEPT YOU FROM MEDICAL APPOINTMENTS OR FROM GETTING MEDICATIONS?: NO

## 2025-06-17 ENCOUNTER — RESULTS FOLLOW-UP (OUTPATIENT)
Facility: CLINIC | Age: 40
End: 2025-06-17

## 2025-06-17 ENCOUNTER — OFFICE VISIT (OUTPATIENT)
Facility: CLINIC | Age: 40
End: 2025-06-17
Payer: COMMERCIAL

## 2025-06-17 VITALS
HEIGHT: 70 IN | RESPIRATION RATE: 16 BRPM | BODY MASS INDEX: 28.2 KG/M2 | OXYGEN SATURATION: 95 % | HEART RATE: 95 BPM | WEIGHT: 197 LBS | DIASTOLIC BLOOD PRESSURE: 88 MMHG | SYSTOLIC BLOOD PRESSURE: 123 MMHG | TEMPERATURE: 98 F

## 2025-06-17 DIAGNOSIS — E78.2 MIXED HYPERLIPIDEMIA: Primary | ICD-10-CM

## 2025-06-17 DIAGNOSIS — G89.29 CHRONIC RIGHT SHOULDER PAIN: ICD-10-CM

## 2025-06-17 DIAGNOSIS — M25.511 CHRONIC RIGHT SHOULDER PAIN: ICD-10-CM

## 2025-06-17 DIAGNOSIS — I10 ESSENTIAL HYPERTENSION: ICD-10-CM

## 2025-06-17 PROCEDURE — 3079F DIAST BP 80-89 MM HG: CPT | Performed by: NURSE PRACTITIONER

## 2025-06-17 PROCEDURE — 99214 OFFICE O/P EST MOD 30 MIN: CPT | Performed by: NURSE PRACTITIONER

## 2025-06-17 PROCEDURE — 3074F SYST BP LT 130 MM HG: CPT | Performed by: NURSE PRACTITIONER

## 2025-06-17 RX ORDER — TIMOLOL MALEATE 5 MG/ML
SOLUTION/ DROPS OPHTHALMIC
COMMUNITY
Start: 2025-06-12

## 2025-06-17 RX ORDER — EZETIMIBE 10 MG/1
10 TABLET ORAL DAILY
Qty: 90 TABLET | Refills: 1 | Status: SHIPPED | OUTPATIENT
Start: 2025-06-17

## 2025-06-17 ASSESSMENT — PATIENT HEALTH QUESTIONNAIRE - PHQ9
SUM OF ALL RESPONSES TO PHQ QUESTIONS 1-9: 0
1. LITTLE INTEREST OR PLEASURE IN DOING THINGS: NOT AT ALL
SUM OF ALL RESPONSES TO PHQ QUESTIONS 1-9: 0
2. FEELING DOWN, DEPRESSED OR HOPELESS: NOT AT ALL
SUM OF ALL RESPONSES TO PHQ QUESTIONS 1-9: 0
SUM OF ALL RESPONSES TO PHQ QUESTIONS 1-9: 0

## 2025-06-17 NOTE — PROGRESS NOTES
Severo Karimi is a 40 y.o. male (: 1985) presenting to address:    Chief Complaint   Patient presents with    6 Month Follow-Up    Shoulder Pain     Right shoulder pain off and on x 3-4 months. Pain worse depending on activity        Vitals:    25 0929   BP: 123/88   Pulse: 95   Resp: 16   Temp: 98 °F (36.7 °C)   SpO2: (!) 65%       \"Have you been to the ER, urgent care clinic since your last visit?  Hospitalized since your last visit?\"    NO    “Have you seen or consulted any other health care providers outside of [facility] since your last visit?”    NO

## 2025-06-17 NOTE — PROGRESS NOTES
[facility] [place] [id number] [facility]  [address]  [place] [id number]  [phone] office/[phone] fax    6/17/2025    Severo Karimi 1985 is a pleasant White (non-) male.       History of Present Illness  The patient presents for evaluation of right shoulder pain and hypercholesterolemia.    Right Shoulder Pain  - He has been experiencing discomfort in his right shoulder for the past 3 to 4 months, with no recollection of any specific injury.  - The pain, described as sharp, radiates down his arm during certain activities such as playing football or lifting his hand above his head.  - He does not experience any pain when placing his hand behind his back.  - Currently, he is not experiencing any pain, but it can be triggered by certain movements, such as throwing football.  - He suspects that the pain may have originated from his role as a , although he does not recall any specific incident.  - He has been managing the pain with Motrin.    Hypercholesterolemia  - He was here 6 months ago and was started on Zetia for his elevated cholesterol.  - He has been taking Zetia daily along with atorvastatin.  - His triglycerides have dropped from 196 to 114, and his LDL is currently at 81.    Supplemental information: He takes omeprazole as needed for reflux and Flexeril as needed for right-sided back pain.      Physical Exam  General Appearance: Normal  Vital signs: Within normal limits  HEENT: Within normal limits  Respiratory: Clear to auscultation, no wheezing, rales or rhonchi  Skin: Warm and dry, no rash  Neurological: Normal  Psychiatric: Normal  MS: No mobility limitations noted to right shoulder    Physical Exam    Vitals:    06/17/25 0929   BP: 123/88   BP Site: Left Upper Arm   Patient Position: Sitting   BP Cuff Size: Medium Adult   Pulse: 95   Resp: 16   Temp: 98 °F (36.7 °C)   TempSrc: Temporal   SpO2: 95%   Weight: 89.4 kg (197 lb)   Height: 1.778 m (5'

## 2025-06-19 PROBLEM — M25.511 CHRONIC RIGHT SHOULDER PAIN: Status: ACTIVE | Noted: 2025-06-19

## 2025-06-19 PROBLEM — G89.29 CHRONIC RIGHT SHOULDER PAIN: Status: ACTIVE | Noted: 2025-06-19

## 2025-06-19 NOTE — ASSESSMENT & PLAN NOTE
Dr. Gonzalez refilled chlorthalidone.  Patient does not require refills at this time  BP well-controlled    12/17/2024:   Well-controlled, continue current medications  Continue chlorthalidone 25 mg daily  CMP WNL  Limit sodium in diet to 2g/d  Initiate cardio exercise  Weight reduction  Increase water intake  Check BP and report if 3 consecutive readings greater than 139/89 to office

## 2025-06-19 NOTE — ASSESSMENT & PLAN NOTE
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 He reports sharp pain when moving the shoulder in certain directions, but no pain at rest.  - Increased pain with certain movements, no pain at rest.  - Advised to avoid activities that exacerbate the pain and to rest the shoulder for 30 days. Use of heat and Biofreeze recommended for symptomatic relief.  - If the pain persists after this period, he should inform via [employer] message, and an x-ray will be ordered, followed by an MRI if necessary.

## 2025-06-19 NOTE — ASSESSMENT & PLAN NOTE
- Cholesterol levels have improved significantly with the current medication regimen. Triglycerides dropped from 196 to 114, and LDL is at 81.  - Effective response to atorvastatin and Zetia.  - Will continue taking atorvastatin 20 mg daily and Zetia 10 mg daily. Prescription refill for Zetia will be sent to pharmacy.  - Recheck of cholesterol levels will be conducted in 6 months during his annual physical examination, and subsequently once a year.    12/17/2024:  -196; LDL 80   Continue atorvastatin 20 mg daily  Initiate Zetia 10 mg daily  Recheck level 6 months    Reduce fried fatty or oily foods in diet  Limit red meats, processed foods, and alcohol.    Limit fast food  Increase physical activity to 60 minutes of moderate intensity aerobic exercise per week.  Increase water intake  Reduce alcohol intake    How to raise HDL if low:  Consume oats, beans, legumes, olive oil, whole grains, nuts, seeds, fatty fish, and berries.  Lose weight/fat  Reduce alcohol consumption  Smoking cessation